# Patient Record
Sex: MALE | Race: WHITE | Employment: STUDENT | URBAN - METROPOLITAN AREA
[De-identification: names, ages, dates, MRNs, and addresses within clinical notes are randomized per-mention and may not be internally consistent; named-entity substitution may affect disease eponyms.]

---

## 2018-02-28 PROBLEM — M79.662 PAIN IN BOTH LOWER LEGS: Status: ACTIVE | Noted: 2018-02-28

## 2018-02-28 PROBLEM — R26.2 DIFFICULTY WALKING: Status: ACTIVE | Noted: 2018-02-28

## 2018-02-28 PROBLEM — M79.661 PAIN IN BOTH LOWER LEGS: Status: ACTIVE | Noted: 2018-02-28

## 2018-03-05 PROBLEM — M62.81 MUSCLE WEAKNESS: Status: ACTIVE | Noted: 2018-03-05

## 2018-03-20 ENCOUNTER — HOSPITAL ENCOUNTER (OUTPATIENT)
Age: 18
Discharge: HOME | End: 2018-03-20
Payer: OTHER GOVERNMENT

## 2018-03-20 LAB
C-REACTIVE PROTEIN: 0.7 MG/DL (ref 0–0.4)
HOMOCYSTEINE: 8.7 UMOL/L (ref 0–15)
RHEUMATOID FACTOR: <10 IU/ML (ref 0–13)
SEDIMENTATION RATE, ERYTHROCYTE: 10 MM/HR (ref 0–15)
TOTAL CK: 64 U/L (ref 20–200)
VITAMIN B-12: 489 PG/ML (ref 211–946)
VITAMIN D 25-HYDROXY: 14 NG/ML (ref 30–100)

## 2018-03-20 PROCEDURE — 82607 VITAMIN B-12: CPT

## 2018-03-20 PROCEDURE — 86703 HIV-1/HIV-2 1 RESULT ANTBDY: CPT

## 2018-03-20 PROCEDURE — 86431 RHEUMATOID FACTOR QUANT: CPT

## 2018-03-20 PROCEDURE — 86618 LYME DISEASE ANTIBODY: CPT

## 2018-03-20 PROCEDURE — 80074 ACUTE HEPATITIS PANEL: CPT

## 2018-03-20 PROCEDURE — 82306 VITAMIN D 25 HYDROXY: CPT

## 2018-03-20 PROCEDURE — 83921 ORGANIC ACID SINGLE QUANT: CPT

## 2018-03-20 PROCEDURE — 85613 RUSSELL VIPER VENOM DILUTED: CPT

## 2018-03-20 PROCEDURE — 84166 PROTEIN E-PHORESIS/URINE/CSF: CPT

## 2018-03-20 PROCEDURE — 82550 ASSAY OF CK (CPK): CPT

## 2018-03-20 PROCEDURE — 85651 RBC SED RATE NONAUTOMATED: CPT

## 2018-03-20 PROCEDURE — 36415 COLL VENOUS BLD VENIPUNCTURE: CPT

## 2018-03-20 PROCEDURE — 83090 ASSAY OF HOMOCYSTEINE: CPT

## 2018-03-20 PROCEDURE — 82085 ASSAY OF ALDOLASE: CPT

## 2018-03-20 PROCEDURE — 84165 PROTEIN E-PHORESIS SERUM: CPT

## 2018-03-20 PROCEDURE — 86140 C-REACTIVE PROTEIN: CPT

## 2018-03-21 LAB
HAV IGM SER IA-ACNC: NORMAL
HEPATITIS B CORE IGM ANTIBODY: NORMAL
HEPATITIS B SURFACE ANTIGEN INTERPRETATION: NORMAL
HEPATITIS C ANTIBODY INTERPRETATION: NORMAL
HIV-1 AND HIV-2 ANTIBODIES: NORMAL
LUPUS ANTICOAG DVVT: NORMAL

## 2018-03-22 LAB
ALDOLASE: 5.6 U/L (ref 1.5–8.1)
LYME, EIA: 0.24 LIV (ref 0–1.2)

## 2018-03-23 LAB
ADDENDUM ELECTROPHORESIS URINE RANDOM: NORMAL
ALBUMIN SERPL-MCNC: 4 G/DL (ref 3.5–4.7)
ALPHA-1-GLOBULIN: 0.3 G/DL (ref 0.2–0.4)
ALPHA-2-GLOBULIN: 1 G/FL (ref 0.5–1)
BETA GLOBULIN: 1.4 G/DL (ref 0.8–1.3)
ELECTROPHORESIS: ABNORMAL
GAMMA GLOBULIN: 1.2 G/DL (ref 0.7–1.6)
TOTAL PROTEIN: 7.9 G/DL (ref 6.4–8.3)

## 2018-03-24 LAB — METHYLMALONIC ACID: 0.1 UMOL/L (ref 0–0.4)

## 2018-07-06 ENCOUNTER — HOSPITAL ENCOUNTER (EMERGENCY)
Age: 18
Discharge: HOME | End: 2018-07-07
Attending: EMERGENCY MEDICINE
Payer: OTHER GOVERNMENT

## 2018-07-06 PROCEDURE — 99284 EMERGENCY DEPT VISIT MOD MDM: CPT

## 2018-07-06 ASSESSMENT — PAIN SCALES - GENERAL: PAINLEVEL_OUTOF10: 1

## 2018-07-06 ASSESSMENT — PAIN DESCRIPTION - LOCATION: LOCATION: LEG

## 2018-07-06 ASSESSMENT — PAIN DESCRIPTION - PAIN TYPE: TYPE: ACUTE PAIN

## 2018-07-06 ASSESSMENT — PAIN DESCRIPTION - ORIENTATION: ORIENTATION: LEFT;LOWER

## 2018-07-07 ENCOUNTER — APPOINTMENT (OUTPATIENT)
Dept: GENERAL RADIOLOGY | Age: 18
End: 2018-07-07
Payer: OTHER GOVERNMENT

## 2018-07-07 ENCOUNTER — APPOINTMENT (OUTPATIENT)
Dept: CT IMAGING | Age: 18
End: 2018-07-07
Payer: OTHER GOVERNMENT

## 2018-07-07 VITALS
WEIGHT: 180 LBS | OXYGEN SATURATION: 99 % | SYSTOLIC BLOOD PRESSURE: 118 MMHG | RESPIRATION RATE: 20 BRPM | HEART RATE: 97 BPM | BODY MASS INDEX: 26.66 KG/M2 | HEIGHT: 69 IN | DIASTOLIC BLOOD PRESSURE: 69 MMHG | TEMPERATURE: 98.3 F

## 2018-07-07 LAB
ANION GAP SERPL CALCULATED.3IONS-SCNC: 13 MMOL/L (ref 7–16)
BASOPHILS ABSOLUTE: 0.04 E9/L (ref 0–0.2)
BASOPHILS RELATIVE PERCENT: 0.4 % (ref 0–2)
BUN BLDV-MCNC: 16 MG/DL (ref 6–20)
CALCIUM SERPL-MCNC: 9.2 MG/DL (ref 8.6–10.2)
CHLORIDE BLD-SCNC: 99 MMOL/L (ref 98–107)
CO2: 29 MMOL/L (ref 22–29)
CREAT SERPL-MCNC: 1 MG/DL (ref 0.7–1.2)
EOSINOPHILS ABSOLUTE: 0.15 E9/L (ref 0.05–0.5)
EOSINOPHILS RELATIVE PERCENT: 1.6 % (ref 0–6)
GFR AFRICAN AMERICAN: >60
GFR NON-AFRICAN AMERICAN: >60 ML/MIN/1.73
GLUCOSE BLD-MCNC: 95 MG/DL (ref 74–109)
HCT VFR BLD CALC: 39.8 % (ref 37–54)
HEMOGLOBIN: 13 G/DL (ref 12.5–16.5)
IMMATURE GRANULOCYTES #: 0.04 E9/L
IMMATURE GRANULOCYTES %: 0.4 % (ref 0–5)
LYMPHOCYTES ABSOLUTE: 2.83 E9/L (ref 1.5–4)
LYMPHOCYTES RELATIVE PERCENT: 30.2 % (ref 20–42)
MCH RBC QN AUTO: 28.1 PG (ref 26–35)
MCHC RBC AUTO-ENTMCNC: 32.7 % (ref 32–34.5)
MCV RBC AUTO: 86.1 FL (ref 80–99.9)
MONOCYTES ABSOLUTE: 0.52 E9/L (ref 0.1–0.95)
MONOCYTES RELATIVE PERCENT: 5.6 % (ref 2–12)
NEUTROPHILS ABSOLUTE: 5.78 E9/L (ref 1.8–7.3)
NEUTROPHILS RELATIVE PERCENT: 61.8 % (ref 43–80)
PDW BLD-RTO: 12.3 FL (ref 11.5–15)
PLATELET # BLD: 249 E9/L (ref 130–450)
PMV BLD AUTO: 11.4 FL (ref 7–12)
POTASSIUM SERPL-SCNC: 3.8 MMOL/L (ref 3.5–5)
RBC # BLD: 4.62 E12/L (ref 3.8–5.8)
SODIUM BLD-SCNC: 141 MMOL/L (ref 132–146)
WBC # BLD: 9.4 E9/L (ref 4.5–11.5)

## 2018-07-07 PROCEDURE — 80048 BASIC METABOLIC PNL TOTAL CA: CPT

## 2018-07-07 PROCEDURE — 36415 COLL VENOUS BLD VENIPUNCTURE: CPT

## 2018-07-07 PROCEDURE — 6370000000 HC RX 637 (ALT 250 FOR IP): Performed by: EMERGENCY MEDICINE

## 2018-07-07 PROCEDURE — 2580000003 HC RX 258: Performed by: EMERGENCY MEDICINE

## 2018-07-07 PROCEDURE — 71275 CT ANGIOGRAPHY CHEST: CPT

## 2018-07-07 PROCEDURE — 6360000004 HC RX CONTRAST MEDICATION: Performed by: EMERGENCY MEDICINE

## 2018-07-07 PROCEDURE — 85025 COMPLETE CBC W/AUTO DIFF WBC: CPT

## 2018-07-07 PROCEDURE — 73560 X-RAY EXAM OF KNEE 1 OR 2: CPT

## 2018-07-07 RX ORDER — SULFAMETHOXAZOLE AND TRIMETHOPRIM 800; 160 MG/1; MG/1
1 TABLET ORAL ONCE
Status: COMPLETED | OUTPATIENT
Start: 2018-07-07 | End: 2018-07-07

## 2018-07-07 RX ORDER — 0.9 % SODIUM CHLORIDE 0.9 %
1000 INTRAVENOUS SOLUTION INTRAVENOUS ONCE
Status: DISCONTINUED | OUTPATIENT
Start: 2018-07-07 | End: 2018-07-07 | Stop reason: HOSPADM

## 2018-07-07 RX ORDER — SULFAMETHOXAZOLE AND TRIMETHOPRIM 800; 160 MG/1; MG/1
1 TABLET ORAL 2 TIMES DAILY
Qty: 20 TABLET | Refills: 0 | Status: SHIPPED | OUTPATIENT
Start: 2018-07-07 | End: 2018-07-17

## 2018-07-07 RX ORDER — CEPHALEXIN 500 MG/1
500 CAPSULE ORAL 4 TIMES DAILY
Qty: 40 CAPSULE | Refills: 0 | Status: SHIPPED | OUTPATIENT
Start: 2018-07-07 | End: 2018-07-17

## 2018-07-07 RX ORDER — SODIUM CHLORIDE 0.9 % (FLUSH) 0.9 %
SYRINGE (ML) INJECTION
Status: DISCONTINUED
Start: 2018-07-07 | End: 2018-07-07 | Stop reason: HOSPADM

## 2018-07-07 RX ORDER — CEPHALEXIN 250 MG/1
500 CAPSULE ORAL ONCE
Status: COMPLETED | OUTPATIENT
Start: 2018-07-07 | End: 2018-07-07

## 2018-07-07 RX ADMIN — CEPHALEXIN 500 MG: 250 CAPSULE ORAL at 02:07

## 2018-07-07 RX ADMIN — IOPAMIDOL 80 ML: 755 INJECTION, SOLUTION INTRAVENOUS at 02:33

## 2018-07-07 RX ADMIN — SULFAMETHOXAZOLE AND TRIMETHOPRIM 1 TABLET: 800; 160 TABLET ORAL at 02:07

## 2018-07-07 ASSESSMENT — ENCOUNTER SYMPTOMS
DIARRHEA: 0
SHORTNESS OF BREATH: 0
VOMITING: 0
COUGH: 0
ABDOMINAL PAIN: 0

## 2018-07-07 NOTE — ED PROVIDER NOTES
exam reveals incision in the left popliteal region to be covered with Dermabond. There is no active drainage. There is a surrounding papulovesicular eruption extending out approximately 3 cm from the wound edges of this incision. There is minor soft tissue swelling of the left calf, some bruising in this area. He has no significant tenderness with palpation of the calf. He has a good strong DP pulse, good cap refill in the toes, no motor or sensory deficits in the lower extremity. The exam is unremarkable.      ----------------------------------------------- PAST HISTORY --------------------------------------------  Past Medical History:  has a past medical history of Bilateral calf pain; Difficulty walking; GERD (gastroesophageal reflux disease); IBS (irritable bowel syndrome); Numbness and tingling; Pain in both lower legs; Popliteal artery stenosis ([address] Prematurity; and Weakness of hand. Past Surgical History:  has a past surgical history that includes Circumcision. Social History:  reports that he has never smoked. He has never used smokeless tobacco. He reports that he does not drink alcohol or use drugs. Family History: family history includes Asthma in his mother; High Blood Pressure in his father. The patients home medications have been reviewed. Allergies: Patient has no known allergies.     ------------------------------------------------ RESULTS ---------------------------------------------------    Labs:  Results for orders placed or performed during the hospital encounter of 07/06/18   CBC auto differential   Result Value Ref Range    WBC 9.4 4.5 - 11.5 E9/L    RBC 4.62 3.80 - 5.80 E12/L    Hemoglobin 13.0 12.5 - 16.5 g/dL    Hematocrit 39.8 37.0 - 54.0 %    MCV 86.1 80.0 - 99.9 fL    MCH 28.1 26.0 - 35.0 pg    MCHC 32.7 32.0 - 34.5 %    RDW 12.3 11.5 - 15.0 fL    Platelets 683 512 - 177 E9/L    MPV 11.4 7.0 - 12.0 fL    Neutrophils % 61.8 43.0 - 80.0 %    Immature Granulocytes % 0.4 patient's wound appears inflamed and irritated, it is not completely clear whether or not this is an infection or just an irritated wound but he did have a fever yesterday and again today and he is mildly tachycardic. So we are going to treat it as a possible wound infection. I spoke with the vascular surgeon on-call. We will start antibiotics. His lab workup is unremarkable here today and he still has a mild tachycardia even after getting IV fluids. This may be on the basis of an infection, but the other concern I have is a possible pulmonary embolism given his recent surgery. He does not have chest pain or difficulty breathing so this is unlikely but we are going to CT his chest prior to discharge. It is too late to HIS LEG TONIGHT BUT I THINK WITH THE CT CHEST BEING NEGATIVE IT MAKES SENSE TO DISCHARGE HIM HOME ON ORAL ANTIBIOTICS AND ASSESS HIM AS AN OUTPATIENT. HE WILL BE GIVEN CLOSE FOLLOW-UP WITH HIS SURGEON. CONSULTATIONS:            I spoke with Dr. Jenna Scruggs, vascular surgeon who did his procedure at the [facility]. He advised that once his workup is done here, I may discharge him home on oral antibiotics and asks that I reiterate to him that he needs to elevate the leg and the Ace wrap on some toes up to proximal to the knee. He will see the patient in his office on Monday, 3 days from now. COUNSELING:   I have spoken with the mother and discussed todays results, in addition to providing specific details for the plan of care and counseling regarding the diagnosis and prognosis.     --------------------------------------- IMPRESSION & DISPOSITION --------------------------------     IMPRESSION(s):  1. Postoperative cellulitis of surgical wound, initial encounter        This patient's ED course included: a personal history and physicial examination and re-evaluation prior to disposition    This patient has remained hemodynamically stable during their ED course.     DISPOSITION:  Disposition: Discharge to home. Patient condition is stable. Endorsement of Care      The remaining care of Nara Garcia was endorsed to Dr. America Riley  on 7/7/18 at 1:56 AM.  Original ED Provider Note Prepared and electronically signed by:  Willie Hernandez MD      END OF PROVIDER NOTE.        Sydney Mendoza MD  07/07/18 0157

## 2019-04-24 ENCOUNTER — HOSPITAL ENCOUNTER (EMERGENCY)
Age: 19
Discharge: HOME | End: 2019-04-24
Payer: OTHER MISCELLANEOUS

## 2019-04-24 ENCOUNTER — APPOINTMENT (OUTPATIENT)
Dept: GENERAL RADIOLOGY | Age: 19
End: 2019-04-24
Payer: OTHER MISCELLANEOUS

## 2019-04-24 VITALS
BODY MASS INDEX: 28.93 KG/M2 | HEIGHT: 69 IN | DIASTOLIC BLOOD PRESSURE: 69 MMHG | SYSTOLIC BLOOD PRESSURE: 138 MMHG | WEIGHT: 195.31 LBS | HEART RATE: 102 BPM | OXYGEN SATURATION: 97 % | TEMPERATURE: 97.9 F | RESPIRATION RATE: 16 BRPM

## 2019-04-24 DIAGNOSIS — S86.911A STRAIN OF RIGHT KNEE, INITIAL ENCOUNTER: Primary | ICD-10-CM

## 2019-04-24 PROCEDURE — 73562 X-RAY EXAM OF KNEE 3: CPT

## 2019-04-24 PROCEDURE — 99283 EMERGENCY DEPT VISIT LOW MDM: CPT

## 2019-04-24 RX ORDER — NAPROXEN 500 MG/1
500 TABLET ORAL 2 TIMES DAILY
Qty: 14 TABLET | Refills: 0 | Status: SHIPPED | OUTPATIENT
Start: 2019-04-24 | End: 2019-05-01

## 2019-04-24 ASSESSMENT — PAIN SCALES - GENERAL: PAINLEVEL_OUTOF10: 6

## 2019-04-24 ASSESSMENT — PAIN DESCRIPTION - LOCATION: LOCATION: KNEE

## 2019-04-24 ASSESSMENT — PAIN DESCRIPTION - ORIENTATION: ORIENTATION: RIGHT

## 2019-04-24 ASSESSMENT — PAIN DESCRIPTION - DESCRIPTORS: DESCRIPTORS: THROBBING

## 2019-04-24 ASSESSMENT — PAIN DESCRIPTION - PAIN TYPE: TYPE: ACUTE PAIN

## 2019-04-24 NOTE — ED PROVIDER NOTES
[facility]    Department of Emergency Medicine   ED  Provider Note  Admit Date/RoomTime: 4/24/2019 11:50 AM  ED Room: [place] [address]  Chief Complaint   Knee Pain (right knee pain and swelling; heard it \"pop\" twice last night)    History of Present Illness   Source of history provided by:  patient. History/Exam Limitations: none. Jennie Zheng is a 22 y.o. old male who has a past medical history of:   Past Medical History:   Diagnosis Date    Bilateral calf pain     Difficulty walking 2/28/2018    GERD (gastroesophageal reflux disease)     IBS (irritable bowel syndrome)     Numbness and tingling     BOTH CALFS    Pain in both lower legs 2/28/2018    Popliteal artery stenosis (HCC)     Prematurity     Weakness of hand     GRIPP WEAKNESS & ALSO SHAKE WHEN GRIPPING    presents to the emergency department by private vehicle, for pain located medial and lateral to right knee  which occured 1 day(s) prior to arrival.  Patient states that he has a hx of popliteal artery stenosis. States he has surgery at the [facility] last year on the left knee. Patient states this pain is different than the popliteal artery stenosis pain that he has. He states that he was standing up last night, states he was stretching his knees inward and felt a pop in the right knee while doing this. States he heard two pops and has had pain since then. He reports increased pain w/movement of the knees, nothing makes it better. He denies any calf pain. ROS    Pertinent positives and negatives are stated within HPI, all other systems reviewed and are negative. Past Surgical History:   Procedure Laterality Date    CIRCUMCISION      KNEE SURGERY     Social History:  reports that he has never smoked. He has never used smokeless tobacco. He reports that he does not drink alcohol or use drugs. Family History: family history includes Asthma in his mother; High Blood Pressure in his father.    Allergies: Patient has no known allergies. Physical Exam          ED Triage Vitals [04/24/19 1146]   BP Temp Temp Source Pulse Resp SpO2 Height Weight   138/69 97.9 °F (36.6 °C) Oral 119 16 96 % 5' 9\" (1.753 m) 195 lb 5 oz (88.6 kg)       Oxygen Saturation Interpretation: Normal.    Constitutional:  Alert, development consistent with age. Neck:  Normal ROM. Supple. Knee:  Right medial, lateral:             Tenderness:  mild. .              Swelling/Effusion: Mild. Deformity: no.              ROM: full range with pain. Skin:  no erythema, rash or wounds noted. Drawer's:  Negative. Lachman's: Negative. Valgus/Varus Stress: Negative. Crepitus: Negative. Hip:            Tenderness:  none. Swelling: None. Deformity: no.              ROM: full range of motion. Skin:  no erythema, rash or wounds noted. Joint(s) Below: calf. Tenderness:  none. Swelling: No.              Skin:  no erythema, rash or wounds noted. Neurovascular: Motor deficit: none. Sensory deficit: none. Pulse deficit: none. 2+ dorsalis pedis pulse of the right foot. Capillary refill: normal.  Gait:  limp. Lymphatics: No lymphangitis or adenopathy noted. Neurological:  Oriented. Motor functions intact. Lab / Imaging Results   (All laboratory and radiology results have been personally reviewed by myself)  Labs:  No results found for this visit on 04/24/19. Imaging: All Radiology results interpreted by Radiologist unless otherwise noted. XR KNEE RIGHT (3 VIEWS)   Final Result   No acute findings. ED Course / Medical Decision Making   Medications - No data to display     Consult(s):   None    Procedure(s):   none. Medical Decision Making:    Patient has no calf tenderness, swelling or erythema, no sign of DVT at this time. He has a good peripheral pulse.  States that this pain feels different than the pain that he gets with the popliteal artery stenosis. States that he was stretching his knees inward and felt a pop and has had pain since then. It is localized to the knee, no pain elsewhere. No acute findings seen on x-ray today. We'll discharge him at this time. Advised patient to return to the ER for any worsening symptoms. Otherwise to follow-up with orthopedic. Counseling: The emergency provider has spoken with the patient and mother and discussed todays results, in addition to providing specific details for the plan of care and counseling regarding the diagnosis and prognosis. Questions are answered at this time and they are agreeable with the plan. Assessment      1. Strain of right knee, initial encounter      Plan   Discharge to home  Patient condition is good    New Medications     New Prescriptions    NAPROXEN (NAPROSYN) 500 MG TABLET    Take 1 tablet by mouth 2 times daily for 7 days     Electronically signed by ES Tristan   DD: 4/24/19  **This report was transcribed using voice recognition software. Every effort was made to ensure accuracy; however, inadvertent computerized transcription errors may be present.   END OF ED PROVIDER NOTE       Jamarcus Tristan  04/24/19 3552

## 2019-04-24 NOTE — ED NOTES
INSTRUCTION TO PT AND MOM PER S [facility]NA[facility] PAC. ACEWRAP TO TINO PROCTOR PER PAC. PT IS USING OWN WALKER FROM PREV SURG.       Donnell Navarrete, ANTIONE  50/37/70 [id number]

## 2019-06-07 ENCOUNTER — APPOINTMENT (OUTPATIENT)
Dept: GENERAL RADIOLOGY | Age: 19
End: 2019-06-07
Payer: COMMERCIAL

## 2019-06-07 ENCOUNTER — HOSPITAL ENCOUNTER (EMERGENCY)
Age: 19
Discharge: HOME | End: 2019-06-07
Payer: COMMERCIAL

## 2019-06-07 VITALS
DIASTOLIC BLOOD PRESSURE: 77 MMHG | RESPIRATION RATE: 18 BRPM | WEIGHT: 195 LBS | SYSTOLIC BLOOD PRESSURE: 122 MMHG | BODY MASS INDEX: 27.3 KG/M2 | HEART RATE: 108 BPM | TEMPERATURE: 99.8 F | HEIGHT: 71 IN | OXYGEN SATURATION: 97 %

## 2019-06-07 DIAGNOSIS — R05.9 COUGH: Primary | ICD-10-CM

## 2019-06-07 DIAGNOSIS — J06.9 ACUTE UPPER RESPIRATORY INFECTION: ICD-10-CM

## 2019-06-07 PROCEDURE — 99283 EMERGENCY DEPT VISIT LOW MDM: CPT

## 2019-06-07 PROCEDURE — 71046 X-RAY EXAM CHEST 2 VIEWS: CPT

## 2019-06-07 RX ORDER — AZITHROMYCIN 250 MG/1
TABLET, FILM COATED ORAL
Qty: 1 PACKET | Refills: 0 | Status: SHIPPED | OUTPATIENT
Start: 2019-06-07 | End: 2019-06-17

## 2019-06-07 RX ORDER — BROMPHENIRAMINE MALEATE, PSEUDOEPHEDRINE HYDROCHLORIDE, AND DEXTROMETHORPHAN HYDROBROMIDE 2; 30; 10 MG/5ML; MG/5ML; MG/5ML
5 SYRUP ORAL 4 TIMES DAILY PRN
Qty: 120 ML | Refills: 0 | Status: SHIPPED | OUTPATIENT
Start: 2019-06-07 | End: 2019-06-07 | Stop reason: SDUPTHER

## 2019-06-07 RX ORDER — BROMPHENIRAMINE MALEATE, PSEUDOEPHEDRINE HYDROCHLORIDE, AND DEXTROMETHORPHAN HYDROBROMIDE 2; 30; 10 MG/5ML; MG/5ML; MG/5ML
5 SYRUP ORAL 4 TIMES DAILY PRN
Qty: 120 ML | Refills: 0 | Status: SHIPPED | OUTPATIENT
Start: 2019-06-07 | End: 2019-06-12

## 2019-06-07 RX ORDER — AZITHROMYCIN 250 MG/1
TABLET, FILM COATED ORAL
Qty: 1 PACKET | Refills: 0 | Status: SHIPPED | OUTPATIENT
Start: 2019-06-07 | End: 2019-06-07 | Stop reason: SDUPTHER

## 2019-06-07 NOTE — ED PROVIDER NOTES
[facility]    HPI: Rohan Aguirre is a 22 y.o. male with a past medical history of  has a past medical history of Bilateral calf pain, Difficulty walking, GERD (gastroesophageal reflux disease), IBS (irritable bowel syndrome), Numbness and tingling, Pain in both lower legs, Popliteal artery stenosis ([address] Prematurity, and Weakness of hand. presenting with complaints of a cough. The patient states that these symptoms began gradually. The history is obtained from the patient. The patient states that he has had some subjective chills at home. Patient does complain of a mild cough associated with it that is nonproductive. Patient denies excessive fatigue or sleeping greater than 18 hours a day. Patient denies exposure to mononucleosis. The patient denies any abdominal pain, left upper quadrant fullness, or early satiety. The patient also denies difficulty breathing, hemoptysis, neck pain/stiffness, or blurry vision. Sx have persisted and are mildly worse which is what prompted the visit today. No knwon exposure to sick contacts. Mother provides information stating that he has had a intermittent fever for the past few days. Most recently early this morning at 101.2. He's had a dry nonproductive cough.        ROS:   Pertinent positives and negatives are stated within HPI, all other systems reviewed and are negative.      --------------------------------------------- PAST HISTORY ---------------------------------------------  Past Medical History:  has a past medical history of Bilateral calf pain, Difficulty walking, GERD (gastroesophageal reflux disease), IBS (irritable bowel syndrome), Numbness and tingling, Pain in both lower legs, Popliteal artery stenosis ([address] Prematurity, and Weakness of hand. Past Surgical History:  has a past surgical history that includes Circumcision and knee surgery. Social History:  reports that he has never smoked.  He has never used smokeless tobacco. He reports that he does not drink alcohol or use drugs. Family History: family history includes Asthma in his mother; High Blood Pressure in his father. The patients home medications have been reviewed. Allergies: Patient has no known allergies. ------------------------- NURSING NOTES AND VITALS REVIEWED ---------------------------   The nursing notes within the ED encounter and vital signs as below have been reviewed by myself. /77   Pulse 108   Temp 99.8 °F (37.7 °C) (Oral)   Resp 18   Ht 5' 11\" (1.803 m)   Wt 195 lb (88.5 kg)   SpO2 97%   BMI 27.20 kg/m²   Oxygen Saturation Interpretation: Normal    The patients available past medical records and past encounters were reviewed. Physical exam:  Constitutional: Vital signs are reviewed the patient is comfortable. The patient is alert and oriented and conversant. Head: The head is atraumatic and normocephalic. Eyes: No discharge is present from the eyes. The sclera are normal.  ENT: The oropharynx demonstrates a small amount of erythema bilaterally. There is no tonsillar enlargement nor is there any exudate present. No uvular deviation or edema. No tonsillary asymmetry.  Floor of the mouth soft, no trismus, handling secretions. TMs bilaterally demonstrate no evidence of infection. Neck: Normal range of motion is achieved in the neck. There is no JVD present. No meningeal signs are present   Anterior cervical adenopathy is normal.  Respiratory/chest: The chest is nontender. Breath sounds are normal. There is no respiratory distress.   Cardiovascular: Heart shows a regular rate and rhythm without murmurs clicks or gallops. Abdominal exam: The abdomen is non tender without evidence of peritoneal signs.  Specific attention to the left upper quadrant with palpation of the spleen demonstrates no organomegaly or tenderness  Skin: warm and dry, without rash  Neurologic: GCS 15   Psych: Normal Affect  -------------------------------------------------- RESULTS -------------------------------------------------    LABS:  No results found for this visit on 06/07/19. RADIOLOGY:  Interpreted by Radiologist.  XR CHEST STANDARD (2 VW)   Final Result   No acute cardiopulmonary disease.             ------------------------------ ED COURSE/MEDICAL DECISION MAKING----------------------  Medications - No data to display          Medical Decision Making:         cough, Upper respiratory infection likely viral in etiology. Not hypoxic, nothing to suggests pneumonia. Well appearing, non toxic, appropriate for outpatient management. Plan is for symptom management and PCP follow up.         This patient's ED course included: a personal history and physicial eaxmination and re-evaluation prior to disposition    This patient has remained hemodynamically stable during their ED course. Counseling: The emergency provider has spoken with the patient and discussed todays results, in addition to providing specific details for the plan of care and counseling regarding the diagnosis and prognosis. Questions are answered at this time and they are agreeable with the plan.       --------------------------------- IMPRESSION AND DISPOSITION ---------------------------------    IMPRESSION  1.  Cough    2. Acute upper respiratory infection        DISPOSITION  Disposition: Discharge to home  Patient condition is good              Jessica Ervin, SARINA - CNP  06/07/19 0800

## 2019-06-07 NOTE — ED NOTES
Patient is alert and oriented at this time, no needs nor concerns, mother at bedside, + cough/ congestion non productive     Nhan Montiel, RN  06/07/19 1594

## 2019-07-03 ENCOUNTER — HOSPITAL ENCOUNTER (EMERGENCY)
Age: 19
Discharge: HOME | End: 2019-07-03
Attending: EMERGENCY MEDICINE
Payer: COMMERCIAL

## 2019-07-03 ENCOUNTER — APPOINTMENT (OUTPATIENT)
Dept: ULTRASOUND IMAGING | Age: 19
End: 2019-07-03
Payer: COMMERCIAL

## 2019-07-03 VITALS
SYSTOLIC BLOOD PRESSURE: 135 MMHG | OXYGEN SATURATION: 98 % | HEIGHT: 71 IN | DIASTOLIC BLOOD PRESSURE: 80 MMHG | WEIGHT: 194 LBS | TEMPERATURE: 98.6 F | HEART RATE: 98 BPM | BODY MASS INDEX: 27.16 KG/M2 | RESPIRATION RATE: 18 BRPM

## 2019-07-03 DIAGNOSIS — R23.8 SKIN IRRITATION: ICD-10-CM

## 2019-07-03 DIAGNOSIS — Z51.89 VISIT FOR WOUND CHECK: Primary | ICD-10-CM

## 2019-07-03 PROCEDURE — 93971 EXTREMITY STUDY: CPT

## 2019-07-03 PROCEDURE — 6370000000 HC RX 637 (ALT 250 FOR IP): Performed by: EMERGENCY MEDICINE

## 2019-07-03 PROCEDURE — 99283 EMERGENCY DEPT VISIT LOW MDM: CPT

## 2019-07-03 RX ORDER — CEPHALEXIN 250 MG/1
500 CAPSULE ORAL ONCE
Status: COMPLETED | OUTPATIENT
Start: 2019-07-03 | End: 2019-07-03

## 2019-07-03 RX ORDER — CEPHALEXIN 500 MG/1
500 CAPSULE ORAL 3 TIMES DAILY
Qty: 21 CAPSULE | Refills: 0 | Status: SHIPPED | OUTPATIENT
Start: 2019-07-03 | End: 2019-07-10

## 2019-07-03 RX ADMIN — CEPHALEXIN 500 MG: 250 CAPSULE ORAL at 21:19

## 2019-07-03 ASSESSMENT — PAIN SCALES - GENERAL: PAINLEVEL_OUTOF10: 5

## 2019-07-03 NOTE — ED PROVIDER NOTES
27-year-old male presenting with concerns about an infection postoperatively. He had a right leg orthopedic surgery in [facility] about 1 week ago. He has developed some redness and some irritation to the skin along the incision. It is not obviously cellulitic, no fevers, no chills. He has some numbness following the surgery that is unchanged from the last week. He is nontoxic-appearing, able to ambulate, no distress. Review of Systems   Skin: Positive for wound. All other systems reviewed and are negative. Physical Exam   Constitutional: He is oriented to person, place, and time. He appears well-developed and well-nourished. No distress. HENT:   Head: Normocephalic and atraumatic. Eyes: Pupils are equal, round, and reactive to light. Neck: Normal range of motion. Neck supple. No thyromegaly present. Cardiovascular: Normal rate and regular rhythm. Pulmonary/Chest: Effort normal and breath sounds normal. No respiratory distress. He has no wheezes. Abdominal: Soft. He exhibits no distension and no mass. There is no tenderness. There is no rebound and no guarding. Musculoskeletal: Normal range of motion. He exhibits no edema or tenderness. Legs:  Surgical incision, some slight erythema around the incision, there is some blistering appearance more consistent with a reaction, not consistent with a cellulitis. No obvious purulent drainage, no crepitus, no induration, no fluctuance   Neurological: He is alert and oriented to person, place, and time. No cranial nerve deficit. Skin: Skin is warm and dry. No erythema. Psychiatric: He has a normal mood and affect.        Procedures    MDM                 --------------------------------------------- PAST HISTORY ---------------------------------------------  Past Medical History:  has a past medical history of Bilateral calf pain, Difficulty walking, GERD (gastroesophageal reflux disease), IBS (irritable bowel syndrome),

## 2022-03-18 ENCOUNTER — OFFICE VISIT (OUTPATIENT)
Dept: SPORTS MEDICINE | Age: 22
End: 2022-03-18
Payer: COMMERCIAL

## 2022-03-18 VITALS — BODY MASS INDEX: 29.4 KG/M2 | HEIGHT: 71 IN | WEIGHT: 210 LBS

## 2022-03-18 DIAGNOSIS — M79.A22 EXERTIONAL COMPARTMENT SYNDROME OF BOTH LOWER EXTREMITIES: ICD-10-CM

## 2022-03-18 DIAGNOSIS — M79.A21 EXERTIONAL COMPARTMENT SYNDROME OF BOTH LOWER EXTREMITIES: ICD-10-CM

## 2022-03-18 DIAGNOSIS — M79.662 BILATERAL CALF PAIN: Primary | ICD-10-CM

## 2022-03-18 DIAGNOSIS — M79.661 BILATERAL CALF PAIN: Primary | ICD-10-CM

## 2022-03-18 PROCEDURE — 99204 OFFICE O/P NEW MOD 45 MIN: CPT | Performed by: FAMILY MEDICINE

## 2022-03-18 RX ORDER — FAMOTIDINE 20 MG/1
20 TABLET, FILM COATED ORAL DAILY
COMMUNITY
End: 2022-07-07 | Stop reason: SDUPTHER

## 2022-03-18 NOTE — PROGRESS NOTES
[facility]  PRIMARY CARE SPORTS MEDICINE  DATE OF VISIT : 3/18/2022    Patient : Rakan Thompson  Age : 19 y.o.  : 2000  MRN : [id number]   ______________________________________________________________________    Chief Complaint :   Chief Complaint   Patient presents with    Leg Pain     Numbness b/l lower extremity along with tingling and burning, pain is localized in the calves and spreads down into the ankles, ongoing for years, ambulating bothers it the most,       HPI : Rakan Thompson is 19 y.o. male who presented to the clinic today for evaluation of bilateral calf pain/numbness. Symptoms have been present for years, without known mechanism of injury. Previously evaluated by [facility] and noticed with popliteal artery entrapment and subsequently underwent bilateral operations that were unsuccessful.   Current symptoms include  pain with associated numbness and tingling after minimal exertion that relieves with rest.     Past Medical History :  Past Medical History:   Diagnosis Date    Bilateral calf pain     Difficulty walking 2018    GERD (gastroesophageal reflux disease)     IBS (irritable bowel syndrome)     Numbness and tingling     BOTH CALFS    Pain in both lower legs 2018    Popliteal artery stenosis (HCC)     Prematurity     Weakness of hand     GRIPP WEAKNESS & ALSO SHAKE WHEN GRIPPING     Past Surgical History:   Procedure Laterality Date    CIRCUMCISION      KNEE SURGERY         Allergies :   No Known Allergies    Medication List :    Current Outpatient Medications   Medication Sig Dispense Refill    famotidine (PEPCID) 20 MG tablet Take 20 mg by mouth daily      Multiple Vitamins-Minerals (THERAPEUTIC MULTIVITAMIN-MINERALS) tablet Take 1 tablet by mouth daily      naproxen (NAPROSYN) 500 MG tablet Take 1 tablet by mouth 2 times daily for 7 days 14 tablet 0    aspirin EC 81 MG EC tablet Take 81 mg by mouth daily (Patient not taking: Reported on 3/18/2022)       No current facility-administered medications for this visit.      ______________________________________________________________________    Physical Exam :    Vitals: Ht 5' 11\" (1.803 m)   Wt 210 lb (95.3 kg)   BMI 29.29 kg/m²   General Appearance: Nontoxic, awake, alert, and in no acute distress. Chest wall/Lung: Respirations regular and unlabored. No cyanosis. Heart: RRR, distal pulses intact. Neurologic: Alert&Oriented x3. Sensation grossly intact. No focal motor deficits detected. Musculokeletal: Mild TTP over midsubstance of the gastroc bilaterally. No focal motor or sensory deficits identified. ______________________________________________________________________    Assessment & Plan :    1. Bilateral calf pain  2. 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 Patient previously treated for popliteal artery entrapment at [facility] but surgery appears to be unsuccessful. Patient endorses bilateral calf pain with minimal exertion stated with numbness and tingling that relieves with rest.  A referral to [facility] children's sports medicine was placed in the office today for evaluation of compartment syndrome either by compartment testing or functional MRI. Patient is agreeable with the above plan and all questions and concerns were addressed in the office today. Return to Office: Return if symptoms worsen or fail to improve.     Casie Martinez MD

## 2022-04-28 NOTE — PROGRESS NOTES
[facility]  Department of Family Medicine      Patient:  Elsa Springer 23 y.o. male     Date of Service: 4/28/22      Chief complaint:   Chief Complaint   [facility]   [facility]     [facility]         History ofPresent Illness   The patient is a 23 y.o. male  presented to the clinic with complaints as above. Compartment syndrome  -referred to [facility]   -another surgery?  -has appt with ortho on Monday  -still having a lot of trouble walking due to this    Is in college, going to [employer] for [employer]  -is going well for him getting a's and b's, currently not in school due to this pain and possible need for surgery     Moving bowels and urinating normally            Past Medical History:      Diagnosis Date    Bilateral calf pain     Difficulty walking 2/28/2018    GERD (gastroesophageal reflux disease)     IBS (irritable bowel syndrome)     Numbness and tingling     BOTH CALFS    Pain in both lower legs 2/28/2018    Popliteal artery stenosis (HCC)     Prematurity     Weakness of hand     GRIPP WEAKNESS & ALSO SHAKE WHEN GRIPPING       PastSurgical History:        Procedure Laterality Date    CIRCUMCISION      KNEE SURGERY      VASCULAR SURGERY         Allergies:    Patient has no known allergies.     Social History:   Social History     Socioeconomic History    Marital status: Single     Spouse name: Not on file    Number of children: Not on file    Years of education: Not on file    Highest education level: Not on file   Occupational History    Not on file   Tobacco Use    Smoking status: Never Smoker    Smokeless tobacco: Never Used   Substance and Sexual Activity    Alcohol use: No    Drug use: No    Sexual activity: Not on file   Other Topics Concern    Not on file   Social History Narrative    Not on file     Social Determinants of Health     Financial Resource Strain:     Difficulty of Paying Living Expenses: Not on file   Food Insecurity:     Worried About Running Out of Food in the Last Year: Not on file    Tracey of Food in the Last Year: Not on file   Transportation Needs:     Lack of Transportation (Medical): Not on file    Lack of Transportation (Non-Medical): Not on file   Physical Activity:     Days of Exercise per Week: Not on file    Minutes of Exercise per Session: Not on file   Stress:     Feeling of Stress : Not on file   Social Connections:     Frequency of Communication with Friends and Family: Not on file    Frequency of Social Gatherings with Friends and Family: Not on file    Attends Sikhism Services: Not on file    Active Member of [address] [place] [address] [employer] or Organizations: Not on file    Attends Club or Organization Meetings: Not on file    Marital Status: Not on file   Intimate Partner Violence:     Fear of Current or Ex-Partner: Not on file    Emotionally Abused: Not on file    Physically Abused: Not on file    Sexually Abused: Not on file   Housing Stability:     Unable to Pay for Housing in the Last Year: Not on file    Number of [place] in the Last Year: Not on file    Unstable Housing in the Last Year: Not on file        Family History:       Problem Relation Age of Onset    Asthma Mother     High Blood Pressure Father        Review of Systems:   Review of Systems - as above     Physical Exam   Vitals: /70   Pulse 92   Temp 96.9 °F (36.1 °C) (Infrared)   Resp 18   Ht 5' 9\" (1.753 m)   Wt 206 lb (93.4 kg)   SpO2 99%   BMI 30.42 kg/m²   Physical Exam  Constitutional:       Appearance: He is well-developed. HENT:      Head: Normocephalic and atraumatic. Eyes:      General:         Right eye: No discharge. Left eye: No discharge. Conjunctiva/sclera: Conjunctivae normal.   Neck:      Trachea: No tracheal deviation. Cardiovascular:      Rate and Rhythm: Normal rate and regular rhythm. Heart sounds: Normal heart sounds.    Pulmonary:      Effort: Pulmonary effort is normal. No respiratory distress. Breath sounds: Normal breath sounds. No wheezing. Abdominal:      General: Bowel sounds are normal. There is no distension. Palpations: Abdomen is soft. Tenderness: There is no abdominal tenderness. Musculoskeletal:         General: No tenderness. Cervical back: Normal range of motion and neck supple. Skin:     General: Skin is warm and dry. Neurological:      Mental Status: He is alert. Psychiatric:         Mood and Affect: Affect is flat. Assessment and Plan       1. Annual physical exam  Needs labs and screening as below  - CBC; Future  - Comprehensive Metabolic Panel; Future  - Hepatitis C Antibody; Future  - HIV Screen; Future  - Lipid Panel; Future    2. Class 1 obesity with body mass index (BMI) of 30.0 to 30.9 in adult, unspecified obesity type, unspecified whether serious comorbidity present  HCM:  - CBC; Future  - Comprehensive Metabolic Panel; Future  - Lipid Panel; Future    3. Pain in both lower legs  Chronic issue  -Compartment syndrome vs vascular entrapment, following with specialist for this     4. Need for hepatitis C screening test  HCM:  - Hepatitis C Antibody; Future    5. Encounter for screening for HIV  HCM:   - HIV Screen; Future      Counseled regarding above diagnosis, including possible risks and complications,  especially if left uncontrolled. Counseled regarding the possible side effects, risks, benefits and alternatives to treatment;patient and/or guardian verbalizes understanding, agrees, feels comfortable with and wishes to proceed with above treatment plan. Call or go to [address] [place] if symptoms worsen or persist. Advised patient to call with any new medication issues, and, as applicable, read all Rx info from pharmacy to assure aware of all possible risks and side effects of medicationbefore taking. Patient and/or guardian given opportunity to ask questions/raise concerns.   The patient verbalized comfort and understanding ofinstructions. I encourage further reading and education about your health conditions. Information on many health conditions is provided by [facility] [employer]: [email or web address]  Please bring any questions to me at your nextvisit. Return to Office: Return in about 1 year (around 4/29/2023) for annual physical .    Medication List:    Current Outpatient Medications   Medication Sig Dispense Refill    Multiple Vitamins-Minerals (THERAPEUTIC MULTIVITAMIN-MINERALS) tablet Take 1 tablet by mouth daily      famotidine (PEPCID) 20 MG tablet Take 20 mg by mouth daily (Patient not taking: Reported on 4/29/2022)      naproxen (NAPROSYN) 500 MG tablet Take 1 tablet by mouth 2 times daily for 7 days 14 tablet 0    aspirin EC 81 MG EC tablet Take 81 mg by mouth daily (Patient not taking: Reported on 3/18/2022)       No current facility-administered medications for this visit. Tavia Glaser 94, DO       This document may have been prepared at least partially through the use of voice recognition software. Although effort is taken to assure the accuracy ofthis document, it is possible that grammatical, syntax,  or spelling errors may occur.

## 2022-04-29 ENCOUNTER — OFFICE VISIT (OUTPATIENT)
Dept: PRIMARY CARE CLINIC | Age: 22
End: 2022-04-29
Payer: COMMERCIAL

## 2022-04-29 VITALS
SYSTOLIC BLOOD PRESSURE: 112 MMHG | HEIGHT: 69 IN | DIASTOLIC BLOOD PRESSURE: 70 MMHG | BODY MASS INDEX: 30.51 KG/M2 | TEMPERATURE: 96.9 F | RESPIRATION RATE: 18 BRPM | WEIGHT: 206 LBS | OXYGEN SATURATION: 99 % | HEART RATE: 92 BPM

## 2022-04-29 DIAGNOSIS — Z00.00 ANNUAL PHYSICAL EXAM: Primary | ICD-10-CM

## 2022-04-29 DIAGNOSIS — Z11.4 ENCOUNTER FOR SCREENING FOR HIV: ICD-10-CM

## 2022-04-29 DIAGNOSIS — Z11.59 NEED FOR HEPATITIS C SCREENING TEST: ICD-10-CM

## 2022-04-29 DIAGNOSIS — Z00.00 ANNUAL PHYSICAL EXAM: ICD-10-CM

## 2022-04-29 DIAGNOSIS — M79.662 PAIN IN BOTH LOWER LEGS: ICD-10-CM

## 2022-04-29 DIAGNOSIS — M79.661 PAIN IN BOTH LOWER LEGS: ICD-10-CM

## 2022-04-29 DIAGNOSIS — E66.9 CLASS 1 OBESITY WITH BODY MASS INDEX (BMI) OF 30.0 TO 30.9 IN ADULT, UNSPECIFIED OBESITY TYPE, UNSPECIFIED WHETHER SERIOUS COMORBIDITY PRESENT: ICD-10-CM

## 2022-04-29 LAB
ALBUMIN SERPL-MCNC: 4.6 G/DL (ref 3.5–5.2)
ALP BLD-CCNC: 110 U/L (ref 40–129)
ALT SERPL-CCNC: 53 U/L (ref 0–40)
ANION GAP SERPL CALCULATED.3IONS-SCNC: 17 MMOL/L (ref 7–16)
AST SERPL-CCNC: 32 U/L (ref 0–39)
BILIRUB SERPL-MCNC: 0.4 MG/DL (ref 0–1.2)
BUN BLDV-MCNC: 16 MG/DL (ref 6–20)
CALCIUM SERPL-MCNC: 9.6 MG/DL (ref 8.6–10.2)
CHLORIDE BLD-SCNC: 107 MMOL/L (ref 98–107)
CHOLESTEROL, TOTAL: 197 MG/DL (ref 0–199)
CO2: 22 MMOL/L (ref 22–29)
CREAT SERPL-MCNC: 1 MG/DL (ref 0.7–1.2)
GFR AFRICAN AMERICAN: >60
GFR NON-AFRICAN AMERICAN: >60 ML/MIN/1.73
GLUCOSE BLD-MCNC: 107 MG/DL (ref 74–99)
HCT VFR BLD CALC: 45.9 % (ref 37–54)
HDLC SERPL-MCNC: 35 MG/DL
HEMOGLOBIN: 14.2 G/DL (ref 12.5–16.5)
LDL CHOLESTEROL CALCULATED: 143 MG/DL (ref 0–99)
MCH RBC QN AUTO: 26.9 PG (ref 26–35)
MCHC RBC AUTO-ENTMCNC: 30.9 % (ref 32–34.5)
MCV RBC AUTO: 87.1 FL (ref 80–99.9)
PDW BLD-RTO: 13.2 FL (ref 11.5–15)
PLATELET # BLD: 233 E9/L (ref 130–450)
PMV BLD AUTO: 12.3 FL (ref 7–12)
POTASSIUM SERPL-SCNC: 4 MMOL/L (ref 3.5–5)
RBC # BLD: 5.27 E12/L (ref 3.8–5.8)
SODIUM BLD-SCNC: 146 MMOL/L (ref 132–146)
TOTAL PROTEIN: 7.7 G/DL (ref 6.4–8.3)
TRIGL SERPL-MCNC: 96 MG/DL (ref 0–149)
VLDLC SERPL CALC-MCNC: 19 MG/DL
WBC # BLD: 8 E9/L (ref 4.5–11.5)

## 2022-04-29 PROCEDURE — 99395 PREV VISIT EST AGE 18-39: CPT | Performed by: STUDENT IN AN ORGANIZED HEALTH CARE EDUCATION/TRAINING PROGRAM

## 2022-04-29 ASSESSMENT — PATIENT HEALTH QUESTIONNAIRE - PHQ9
SUM OF ALL RESPONSES TO PHQ9 QUESTIONS 1 & 2: 0
SUM OF ALL RESPONSES TO PHQ QUESTIONS 1-9: 0
SUM OF ALL RESPONSES TO PHQ QUESTIONS 1-9: 0
1. LITTLE INTEREST OR PLEASURE IN DOING THINGS: 0
2. FEELING DOWN, DEPRESSED OR HOPELESS: 0
SUM OF ALL RESPONSES TO PHQ QUESTIONS 1-9: 0
SUM OF ALL RESPONSES TO PHQ QUESTIONS 1-9: 0

## 2022-05-02 LAB
HEPATITIS C ANTIBODY INTERPRETATION: NORMAL
HIV-1 AND HIV-2 ANTIBODIES: NORMAL

## 2022-07-07 ENCOUNTER — OFFICE VISIT (OUTPATIENT)
Dept: PRIMARY CARE CLINIC | Age: 22
End: 2022-07-07
Payer: COMMERCIAL

## 2022-07-07 VITALS
OXYGEN SATURATION: 100 % | BODY MASS INDEX: 31.76 KG/M2 | WEIGHT: 214.4 LBS | TEMPERATURE: 96.8 F | HEIGHT: 69 IN | DIASTOLIC BLOOD PRESSURE: 60 MMHG | SYSTOLIC BLOOD PRESSURE: 122 MMHG | HEART RATE: 74 BPM | RESPIRATION RATE: 18 BRPM

## 2022-07-07 DIAGNOSIS — K52.9 GASTROENTERITIS: Primary | ICD-10-CM

## 2022-07-07 PROCEDURE — 99213 OFFICE O/P EST LOW 20 MIN: CPT | Performed by: STUDENT IN AN ORGANIZED HEALTH CARE EDUCATION/TRAINING PROGRAM

## 2022-07-07 RX ORDER — ONDANSETRON 4 MG/1
4 TABLET, FILM COATED ORAL 3 TIMES DAILY PRN
Qty: 15 TABLET | Refills: 0 | Status: SHIPPED | OUTPATIENT
Start: 2022-07-07

## 2022-07-07 RX ORDER — FAMOTIDINE 20 MG/1
20 TABLET, FILM COATED ORAL DAILY
Qty: 60 TABLET | Refills: 0 | Status: SHIPPED | OUTPATIENT
Start: 2022-07-07

## 2022-07-07 SDOH — ECONOMIC STABILITY: FOOD INSECURITY: WITHIN THE PAST 12 MONTHS, YOU WORRIED THAT YOUR FOOD WOULD RUN OUT BEFORE YOU GOT MONEY TO BUY MORE.: NEVER TRUE

## 2022-07-07 SDOH — ECONOMIC STABILITY: FOOD INSECURITY: WITHIN THE PAST 12 MONTHS, THE FOOD YOU BOUGHT JUST DIDN'T LAST AND YOU DIDN'T HAVE MONEY TO GET MORE.: NEVER TRUE

## 2022-07-07 ASSESSMENT — SOCIAL DETERMINANTS OF HEALTH (SDOH): HOW HARD IS IT FOR YOU TO PAY FOR THE VERY BASICS LIKE FOOD, HOUSING, MEDICAL CARE, AND HEATING?: NOT HARD AT ALL

## 2022-07-07 NOTE — PROGRESS NOTES
[facility]  Department of Family Medicine      Patient:  Khushboo Sotelo 23 y.o. male     Date of Service: 7/7/22      Chief complaint:   Chief Complaint   Patient presents with    Diarrhea     since Friday     Nausea     sinceFriday         History ofPresent Illness   The patient is a 23 y.o. male  presented to the clinic with complaints as above. Diarrhea and nausea  -new issue  -started Friday evening after going out to eat  -has vomited three times since Friday  -having diarrhea daily, loose stool bowel movements 4 times a day, denies any bloody or black stools  -had low grade fever on July 4 which has resolved  -gave zofran which helps  -has been eating crackers and has been keeping those down since this morning  -not drinking much water   -is having periumbilical abdominal pain which is constant     Past Medical History:      Diagnosis Date    Bilateral calf pain     Difficulty walking 2/28/2018    GERD (gastroesophageal reflux disease)     IBS (irritable bowel syndrome)     Numbness and tingling     BOTH CALFS    Pain in both lower legs 2/28/2018    Popliteal artery stenosis (HCC)     Prematurity     Weakness of hand     GRIPP WEAKNESS & ALSO SHAKE WHEN GRIPPING       PastSurgical History:        Procedure Laterality Date    CIRCUMCISION      KNEE SURGERY      VASCULAR SURGERY         Allergies:    Patient has no known allergies.     Social History:   Social History     Socioeconomic History    Marital status: Single     Spouse name: Not on file    Number of children: Not on file    Years of education: Not on file    Highest education level: Not on file   Occupational History    Not on file   Tobacco Use    Smoking status: Never Smoker    Smokeless tobacco: Never Used   Substance and Sexual Activity    Alcohol use: No    Drug use: No    Sexual activity: Not on file   Other Topics Concern    Not on file   Social History Narrative    Not on file     Social Determinants of Health     Financial Resource Strain: Low Risk     Difficulty of Paying Living Expenses: Not hard at all   Food Insecurity: No Food Insecurity    Worried About Running Out of Food in the Last Year: Never true    Tracey of Food in the Last Year: Never true   Transportation Needs:     Lack of Transportation (Medical): Not on file    Lack of Transportation (Non-Medical): Not on file   Physical Activity:     Days of Exercise per Week: Not on file    Minutes of Exercise per Session: Not on file   Stress:     Feeling of Stress : Not on file   Social Connections:     Frequency of Communication with Friends and Family: Not on file    Frequency of Social Gatherings with Friends and Family: Not on file    Attends Jewish Services: Not on file    Active Member of [employer] or Organizations: Not on file    Attends Club or Organization Meetings: Not on file    Marital Status: Not on file   Intimate Partner Violence:     Fear of Current or Ex-Partner: Not on file    Emotionally Abused: Not on file    Physically Abused: Not on file    Sexually Abused: Not on file   Housing Stability:     Unable to Pay for Housing in the Last Year: Not on file    Number of [place] in the Last Year: Not on file    Unstable Housing in the Last Year: Not on file        Family History:       Problem Relation Age of Onset    Asthma Mother     High Blood Pressure Father        Review of Systems:   Review of Systems - as above     Physical Exam   Vitals: /60   Pulse 74   Temp 96.8 °F (36 °C)   Resp 18   Ht 5' 9\" (1.753 m)   Wt 214 lb 6.4 oz (97.3 kg)   SpO2 100%   BMI 31.66 kg/m²   Physical Exam  Constitutional:       Appearance: He is well-developed. HENT:      Head: Normocephalic and atraumatic. Eyes:      General:         Right eye: No discharge. Left eye: No discharge. Conjunctiva/sclera: Conjunctivae normal.   Neck:      Trachea: No tracheal deviation.    Cardiovascular: Rate and Rhythm: Normal rate and regular rhythm. Heart sounds: Normal heart sounds. Pulmonary:      Effort: Pulmonary effort is normal. No respiratory distress. Breath sounds: Normal breath sounds. No wheezing. Abdominal:      General: Bowel sounds are normal. There is no distension. Palpations: Abdomen is soft. Tenderness: There is abdominal tenderness (periumbilical, mild). Musculoskeletal:         General: No tenderness. Cervical back: Normal range of motion and neck supple. Skin:     General: Skin is warm and dry. Neurological:      Mental Status: He is alert. Psychiatric:         Behavior: Behavior normal.           Assessment and Plan       1. Gastroenteritis  New issue  -Given symptoms, seems most likely viral, vitals stable and weight stable, will treat supportively for now with below medications and advised staying well hydrated, any worsening let me know   - famotidine (PEPCID) 20 MG tablet; Take 1 tablet by mouth daily  Dispense: 60 tablet; Refill: 0  - ondansetron (ZOFRAN) 4 MG tablet; Take 1 tablet by mouth 3 times daily as needed for Nausea or Vomiting  Dispense: 15 tablet; Refill: 0      Counseled regarding above diagnosis, including possible risks and complications,  especially if left uncontrolled. Counseled regarding the possible side effects, risks, benefits and alternatives to treatment;patient and/or guardian verbalizes understanding, agrees, feels comfortable with and wishes to proceed with above treatment plan. Call or go to [address] [facility] if symptoms worsen or persist. Advised patient to call with any new medication issues, and, as applicable, read all Rx info from pharmacy to assure aware of all possible risks and side effects of medicationbefore taking. Patient and/or guardian given opportunity to ask questions/raise concerns. The patient verbalized comfort and understanding ofinstructions.     I encourage further reading and education about your health conditions. Information on many health conditions is provided by [facility] Academy of Family Physicians: [email or web address]  Please bring any questions to me at your nextvisit. Return to Office: Return if symptoms worsen or fail to improve. Medication List:    Current Outpatient Medications   Medication Sig Dispense Refill    famotidine (PEPCID) 20 MG tablet Take 1 tablet by mouth daily 60 tablet 0    ondansetron (ZOFRAN) 4 MG tablet Take 1 tablet by mouth 3 times daily as needed for Nausea or Vomiting 15 tablet 0    Multiple Vitamins-Minerals (THERAPEUTIC MULTIVITAMIN-MINERALS) tablet Take 1 tablet by mouth daily      naproxen (NAPROSYN) 500 MG tablet Take 1 tablet by mouth 2 times daily for 7 days (Patient not taking: Reported on 7/7/2022) 14 tablet 0    aspirin EC 81 MG EC tablet Take 81 mg by mouth daily (Patient not taking: Reported on 3/18/2022)       No current facility-administered medications for this visit. Mike Rivas, DO       This document may have been prepared at least partially through the use of voice recognition software. Although effort is taken to assure the accuracy ofthis document, it is possible that grammatical, syntax,  or spelling errors may occur.

## 2023-01-28 DIAGNOSIS — K52.9 GASTROENTERITIS: ICD-10-CM

## 2023-01-30 RX ORDER — FAMOTIDINE 20 MG/1
TABLET, FILM COATED ORAL
Qty: 60 TABLET | Refills: 0 | Status: SHIPPED | OUTPATIENT
Start: 2023-01-30

## 2023-05-05 ENCOUNTER — OFFICE VISIT (OUTPATIENT)
Dept: PRIMARY CARE CLINIC | Age: 23
End: 2023-05-05
Payer: COMMERCIAL

## 2023-05-05 VITALS
OXYGEN SATURATION: 98 % | DIASTOLIC BLOOD PRESSURE: 72 MMHG | BODY MASS INDEX: 33.18 KG/M2 | RESPIRATION RATE: 17 BRPM | HEIGHT: 69 IN | HEART RATE: 91 BPM | TEMPERATURE: 97.1 F | WEIGHT: 224 LBS | SYSTOLIC BLOOD PRESSURE: 102 MMHG

## 2023-05-05 DIAGNOSIS — E66.9 CLASS 1 OBESITY WITH BODY MASS INDEX (BMI) OF 30.0 TO 30.9 IN ADULT, UNSPECIFIED OBESITY TYPE, UNSPECIFIED WHETHER SERIOUS COMORBIDITY PRESENT: ICD-10-CM

## 2023-05-05 DIAGNOSIS — Z00.00 ANNUAL PHYSICAL EXAM: Primary | ICD-10-CM

## 2023-05-05 DIAGNOSIS — Z00.00 ANNUAL PHYSICAL EXAM: ICD-10-CM

## 2023-05-05 LAB
ALBUMIN SERPL-MCNC: 4.5 G/DL (ref 3.5–5.2)
ALP SERPL-CCNC: 99 U/L (ref 40–129)
ALT SERPL-CCNC: 33 U/L (ref 0–40)
ANION GAP SERPL CALCULATED.3IONS-SCNC: 12 MMOL/L (ref 7–16)
AST SERPL-CCNC: 22 U/L (ref 0–39)
BILIRUB SERPL-MCNC: 0.6 MG/DL (ref 0–1.2)
BUN SERPL-MCNC: 12 MG/DL (ref 6–20)
CALCIUM SERPL-MCNC: 9.8 MG/DL (ref 8.6–10.2)
CHLORIDE SERPL-SCNC: 103 MMOL/L (ref 98–107)
CHOLESTEROL, TOTAL: 187 MG/DL (ref 0–199)
CO2 SERPL-SCNC: 27 MMOL/L (ref 22–29)
CREAT SERPL-MCNC: 1.1 MG/DL (ref 0.7–1.2)
ERYTHROCYTE [DISTWIDTH] IN BLOOD BY AUTOMATED COUNT: 13.4 FL (ref 11.5–15)
GLUCOSE SERPL-MCNC: 96 MG/DL (ref 74–99)
HCT VFR BLD AUTO: 49.3 % (ref 37–54)
HDLC SERPL-MCNC: 32 MG/DL
HGB BLD-MCNC: 14.6 G/DL (ref 12.5–16.5)
LDLC SERPL CALC-MCNC: 131 MG/DL (ref 0–99)
MCH RBC QN AUTO: 27.1 PG (ref 26–35)
MCHC RBC AUTO-ENTMCNC: 29.6 % (ref 32–34.5)
MCV RBC AUTO: 91.6 FL (ref 80–99.9)
PLATELET # BLD AUTO: 223 E9/L (ref 130–450)
PMV BLD AUTO: 12.6 FL (ref 7–12)
POTASSIUM SERPL-SCNC: 4.7 MMOL/L (ref 3.5–5)
PROT SERPL-MCNC: 7.6 G/DL (ref 6.4–8.3)
RBC # BLD AUTO: 5.38 E12/L (ref 3.8–5.8)
SODIUM SERPL-SCNC: 142 MMOL/L (ref 132–146)
TRIGL SERPL-MCNC: 121 MG/DL (ref 0–149)
TSH SERPL-MCNC: 3.5 UIU/ML (ref 0.27–4.2)
VLDLC SERPL CALC-MCNC: 24 MG/DL
WBC # BLD: 9.5 E9/L (ref 4.5–11.5)

## 2023-05-05 PROCEDURE — 99395 PREV VISIT EST AGE 18-39: CPT | Performed by: STUDENT IN AN ORGANIZED HEALTH CARE EDUCATION/TRAINING PROGRAM

## 2023-05-05 SDOH — ECONOMIC STABILITY: FOOD INSECURITY: WITHIN THE PAST 12 MONTHS, THE FOOD YOU BOUGHT JUST DIDN'T LAST AND YOU DIDN'T HAVE MONEY TO GET MORE.: NEVER TRUE

## 2023-05-05 SDOH — ECONOMIC STABILITY: HOUSING INSECURITY
IN THE LAST 12 MONTHS, WAS THERE A TIME WHEN YOU DID NOT HAVE A STEADY PLACE TO SLEEP OR SLEPT IN A SHELTER (INCLUDING NOW)?: NO

## 2023-05-05 SDOH — ECONOMIC STABILITY: FOOD INSECURITY: WITHIN THE PAST 12 MONTHS, YOU WORRIED THAT YOUR FOOD WOULD RUN OUT BEFORE YOU GOT MONEY TO BUY MORE.: NEVER TRUE

## 2023-05-05 SDOH — ECONOMIC STABILITY: INCOME INSECURITY: HOW HARD IS IT FOR YOU TO PAY FOR THE VERY BASICS LIKE FOOD, HOUSING, MEDICAL CARE, AND HEATING?: NOT HARD AT ALL

## 2023-05-05 ASSESSMENT — PATIENT HEALTH QUESTIONNAIRE - PHQ9
SUM OF ALL RESPONSES TO PHQ QUESTIONS 1-9: 0
SUM OF ALL RESPONSES TO PHQ QUESTIONS 1-9: 0
2. FEELING DOWN, DEPRESSED OR HOPELESS: 0
SUM OF ALL RESPONSES TO PHQ QUESTIONS 1-9: 0
SUM OF ALL RESPONSES TO PHQ9 QUESTIONS 1 & 2: 0
1. LITTLE INTEREST OR PLEASURE IN DOING THINGS: 0
SUM OF ALL RESPONSES TO PHQ QUESTIONS 1-9: 0

## 2023-05-05 NOTE — PROGRESS NOTES
[facility]  Department of Family Medicine      Patient:  Wayne Blackman 20 y.o. male     Date of Service: 5/5/23      Chief complaint:   Chief Complaint   Patient presents with    Annual Exam         History ofPresent Illness   The patient is a 20 y.o. male  presented to the clinic with complaints as above. Annual exam  -still in school, going well     Compartment syndrome   -f/u  -seeing specialist for this, pain is a little better    HLD  -f/u  -has been watching diet    Denies any chest pain, lightheadedness, or dizziness     Past Medical History:      Diagnosis Date    Bilateral calf pain     Difficulty walking 2/28/2018    GERD (gastroesophageal reflux disease)     IBS (irritable bowel syndrome)     Numbness and tingling     BOTH CALFS    Pain in both lower legs 2/28/2018    Popliteal artery stenosis (HCC)     Prematurity     Weakness of hand     GRIPP WEAKNESS & ALSO SHAKE WHEN GRIPPING       PastSurgical History:        Procedure Laterality Date    CIRCUMCISION      KNEE SURGERY      VASCULAR SURGERY         Allergies:    Patient has no known allergies.     Social History:   Social History     Socioeconomic History    Marital status: Single     Spouse name: Not on file    Number of children: Not on file    Years of education: Not on file    Highest education level: Not on file   Occupational History    Not on file   Tobacco Use    Smoking status: Never    Smokeless tobacco: Never   Substance and Sexual Activity    Alcohol use: No    Drug use: No    Sexual activity: Not on file   Other Topics Concern    Not on file   Social History Narrative    Not on file     Social Determinants of Health     Financial Resource Strain: Low Risk     Difficulty of Paying Living Expenses: Not hard at all   Food Insecurity: No Food Insecurity    Worried About Running Out of Food in the Last Year: Never true    [address] in the Last Year: Never true   Transportation Needs: Unknown    Lack of

## 2023-05-08 ENCOUNTER — TELEPHONE (OUTPATIENT)
Dept: PRIMARY CARE CLINIC | Age: 23
End: 2023-05-08

## 2023-05-08 NOTE — TELEPHONE ENCOUNTER
Pt returned call regarding most recent labs. Read to pt Dr Billy Clifton result notes.  Pt verbalized understanding and no further questions

## 2023-07-05 NOTE — PROGRESS NOTES
[facility]  Department of Family Medicine      Patient:  Beena Velásquez 20 y.o. male     Date of Service: 7/6/23      Chief complaint:   Chief Complaint   Patient presents with    Follow-up     anxiety         History ofPresent Illness   The patient is a 20 y.o. male  presented to the clinic with complaints as above. Anxiety  -chronic issue  -not worsening, just bothersome for him  -has a lot of social anxiety with talking to people, even online  -has never been on medication for this   -having some mild depression with it     Past Medical History:      Diagnosis Date    Bilateral calf pain     Difficulty walking 2/28/2018    GERD (gastroesophageal reflux disease)     IBS (irritable bowel syndrome)     Numbness and tingling     BOTH CALFS    Pain in both lower legs 2/28/2018    Popliteal artery stenosis (HCC)     Prematurity     Weakness of hand     GRIPP WEAKNESS & ALSO SHAKE WHEN GRIPPING       PastSurgical History:        Procedure Laterality Date    CIRCUMCISION      KNEE SURGERY      VASCULAR SURGERY         Allergies:    Patient has no known allergies.     Social History:   Social History     Socioeconomic History    Marital status: Single     Spouse name: Not on file    Number of children: Not on file    Years of education: Not on file    Highest education level: Not on file   Occupational History    Not on file   Tobacco Use    Smoking status: Never    Smokeless tobacco: Never   Substance and Sexual Activity    Alcohol use: No    Drug use: No    Sexual activity: Not on file   Other Topics Concern    Not on file   Social History Narrative    Not on file     Social Determinants of Health     Financial Resource Strain: Low Risk     Difficulty of Paying Living Expenses: Not hard at all   Food Insecurity: No Food Insecurity    Worried About Running Out of Food in the Last Year: Never true    [address] in the Last Year: Never true   Transportation Needs: Unknown    Lack of

## 2023-07-06 ENCOUNTER — OFFICE VISIT (OUTPATIENT)
Dept: PRIMARY CARE CLINIC | Age: 23
End: 2023-07-06
Payer: COMMERCIAL

## 2023-07-06 VITALS
TEMPERATURE: 96.9 F | SYSTOLIC BLOOD PRESSURE: 112 MMHG | OXYGEN SATURATION: 99 % | BODY MASS INDEX: 32.73 KG/M2 | WEIGHT: 221 LBS | DIASTOLIC BLOOD PRESSURE: 74 MMHG | HEART RATE: 92 BPM | HEIGHT: 69 IN

## 2023-07-06 DIAGNOSIS — F41.9 ANXIETY AND DEPRESSION: Primary | ICD-10-CM

## 2023-07-06 DIAGNOSIS — F32.A ANXIETY AND DEPRESSION: Primary | ICD-10-CM

## 2023-07-06 PROCEDURE — 99213 OFFICE O/P EST LOW 20 MIN: CPT | Performed by: STUDENT IN AN ORGANIZED HEALTH CARE EDUCATION/TRAINING PROGRAM

## 2023-07-06 RX ORDER — CITALOPRAM 10 MG/1
10 TABLET ORAL DAILY
Qty: 30 TABLET | Refills: 0 | Status: SHIPPED | OUTPATIENT
Start: 2023-07-06

## 2023-08-08 ENCOUNTER — OFFICE VISIT (OUTPATIENT)
Dept: PRIMARY CARE CLINIC | Age: 23
End: 2023-08-08
Payer: COMMERCIAL

## 2023-08-08 VITALS
SYSTOLIC BLOOD PRESSURE: 100 MMHG | DIASTOLIC BLOOD PRESSURE: 62 MMHG | TEMPERATURE: 97.1 F | OXYGEN SATURATION: 99 % | BODY MASS INDEX: 30.1 KG/M2 | RESPIRATION RATE: 18 BRPM | HEART RATE: 89 BPM | WEIGHT: 215 LBS | HEIGHT: 71 IN

## 2023-08-08 DIAGNOSIS — F32.A ANXIETY AND DEPRESSION: ICD-10-CM

## 2023-08-08 DIAGNOSIS — F41.9 ANXIETY AND DEPRESSION: ICD-10-CM

## 2023-08-08 PROCEDURE — 99213 OFFICE O/P EST LOW 20 MIN: CPT | Performed by: STUDENT IN AN ORGANIZED HEALTH CARE EDUCATION/TRAINING PROGRAM

## 2023-08-08 RX ORDER — CITALOPRAM 20 MG/1
20 TABLET ORAL DAILY
Qty: 30 TABLET | Refills: 0 | Status: SHIPPED | OUTPATIENT
Start: 2023-08-08

## 2023-09-01 ENCOUNTER — TELEPHONE (OUTPATIENT)
Dept: PRIMARY CARE CLINIC | Age: 23
End: 2023-09-01

## 2023-09-01 NOTE — TELEPHONE ENCOUNTER
Patients parent called pt has been nauseous for a few days  Their questions is , is it from the increase in medication from last visit  citalopram (CELEXA) 20 MG tablet       Asking can pt be seen today , no available appts at this time

## 2023-09-01 NOTE — TELEPHONE ENCOUNTER
Mother notified. Verbalized understanding. Patient has an appointment 9/8.  Will follow up and discuss then

## 2023-09-01 NOTE — TELEPHONE ENCOUNTER
There are a lot of illnesses going around, he could have a viral illness, however this medication can make a person feel nauseous, if so then they may get over this with time.     Best regards,  Jorge Luis Catalan

## 2023-09-08 ENCOUNTER — OFFICE VISIT (OUTPATIENT)
Dept: PRIMARY CARE CLINIC | Age: 23
End: 2023-09-08
Payer: COMMERCIAL

## 2023-09-08 VITALS
RESPIRATION RATE: 17 BRPM | SYSTOLIC BLOOD PRESSURE: 110 MMHG | OXYGEN SATURATION: 97 % | WEIGHT: 213 LBS | HEART RATE: 88 BPM | DIASTOLIC BLOOD PRESSURE: 70 MMHG | HEIGHT: 69 IN | BODY MASS INDEX: 31.55 KG/M2 | TEMPERATURE: 97.5 F

## 2023-09-08 DIAGNOSIS — K21.9 GASTROESOPHAGEAL REFLUX DISEASE, UNSPECIFIED WHETHER ESOPHAGITIS PRESENT: Primary | ICD-10-CM

## 2023-09-08 DIAGNOSIS — F32.A ANXIETY AND DEPRESSION: ICD-10-CM

## 2023-09-08 DIAGNOSIS — F41.9 ANXIETY AND DEPRESSION: ICD-10-CM

## 2023-09-08 PROCEDURE — 99214 OFFICE O/P EST MOD 30 MIN: CPT | Performed by: STUDENT IN AN ORGANIZED HEALTH CARE EDUCATION/TRAINING PROGRAM

## 2023-09-08 RX ORDER — CITALOPRAM HYDROBROMIDE 10 MG/1
10 TABLET ORAL DAILY
Qty: 30 TABLET | Refills: 1 | Status: SHIPPED | OUTPATIENT
Start: 2023-09-08

## 2023-09-08 RX ORDER — FAMOTIDINE 20 MG/1
20 TABLET, FILM COATED ORAL 2 TIMES DAILY
Qty: 60 TABLET | Refills: 3 | Status: SHIPPED | OUTPATIENT
Start: 2023-09-08

## 2023-09-08 NOTE — PROGRESS NOTES
[facility]  Department of Family Medicine      Patient:  Prasad Park 20 y.o. male     Date of Service: 9/8/23      Chief complaint:   Chief Complaint   Patient presents with    Anxiety    Depression         History ofPresent Illness   The patient is a 20 y.o. male  presented to the clinic with complaints as above. Anxiety  -f/u  -increased celexa during last visit,   -currently, feeling like he is getting abdominal pain, headaches  -all these symptoms started after the increase in celexa   -denies any HI or SI     GERD  -f/u  -pepcid was helping    Past Medical History:      Diagnosis Date    Bilateral calf pain     Difficulty walking 2/28/2018    GERD (gastroesophageal reflux disease)     IBS (irritable bowel syndrome)     Numbness and tingling     BOTH CALFS    Pain in both lower legs 2/28/2018    Popliteal artery stenosis (HCC)     Prematurity     Weakness of hand     GRIPP WEAKNESS & ALSO SHAKE WHEN GRIPPING       PastSurgical History:        Procedure Laterality Date    CIRCUMCISION      KNEE SURGERY      VASCULAR SURGERY         Allergies:    Patient has no known allergies.     Social History:   Social History     Socioeconomic History    Marital status: Single     Spouse name: Not on file    Number of children: Not on file    Years of education: Not on file    Highest education level: Not on file   Occupational History    Not on file   Tobacco Use    Smoking status: Never    Smokeless tobacco: Never   Substance and Sexual Activity    Alcohol use: No    Drug use: No    Sexual activity: Not on file   Other Topics Concern    Not on file   Social History Narrative    Not on file     Social Determinants of Health     Financial Resource Strain: Low Risk     Difficulty of Paying Living Expenses: Not hard at all   Food Insecurity: No Food Insecurity    Worried About Running Out of Food in the Last Year: Never true    [address] in the Last Year: Never true   Transportation Needs:

## 2023-09-11 ENCOUNTER — OFFICE VISIT (OUTPATIENT)
Dept: PRIMARY CARE CLINIC | Age: 23
End: 2023-09-11
Payer: COMMERCIAL

## 2023-09-11 VITALS
WEIGHT: 220 LBS | RESPIRATION RATE: 20 BRPM | BODY MASS INDEX: 32.58 KG/M2 | HEART RATE: 112 BPM | SYSTOLIC BLOOD PRESSURE: 134 MMHG | OXYGEN SATURATION: 98 % | HEIGHT: 69 IN | TEMPERATURE: 97.3 F | DIASTOLIC BLOOD PRESSURE: 87 MMHG

## 2023-09-11 DIAGNOSIS — R05.9 COUGH IN ADULT: ICD-10-CM

## 2023-09-11 DIAGNOSIS — J06.9 ACUTE URI: Primary | ICD-10-CM

## 2023-09-11 LAB
INFLUENZA A ANTIGEN, POC: NEGATIVE
INFLUENZA B ANTIGEN, POC: NEGATIVE
LOT EXPIRE DATE: NORMAL
LOT KIT NUMBER: NORMAL
S PYO AG THROAT QL: NORMAL
SARS-COV-2, POC: NORMAL
VALID INTERNAL CONTROL: NORMAL
VENDOR AND KIT NAME POC: NORMAL

## 2023-09-11 PROCEDURE — 87428 SARSCOV & INF VIR A&B AG IA: CPT | Performed by: NURSE PRACTITIONER

## 2023-09-11 PROCEDURE — 99213 OFFICE O/P EST LOW 20 MIN: CPT | Performed by: NURSE PRACTITIONER

## 2023-09-11 PROCEDURE — 87880 STREP A ASSAY W/OPTIC: CPT | Performed by: NURSE PRACTITIONER

## 2023-09-11 RX ORDER — PREDNISONE 10 MG/1
10 TABLET ORAL 2 TIMES DAILY
Qty: 6 TABLET | Refills: 0 | Status: SHIPPED | OUTPATIENT
Start: 2023-09-11 | End: 2023-09-14

## 2023-09-11 RX ORDER — BROMPHENIRAMINE MALEATE, PSEUDOEPHEDRINE HYDROCHLORIDE, AND DEXTROMETHORPHAN HYDROBROMIDE 2; 30; 10 MG/5ML; MG/5ML; MG/5ML
5 SYRUP ORAL 4 TIMES DAILY PRN
Qty: 118 ML | Refills: 0 | Status: SHIPPED | OUTPATIENT
Start: 2023-09-11

## 2023-09-11 RX ORDER — CEFDINIR 300 MG/1
300 CAPSULE ORAL 2 TIMES DAILY
Qty: 20 CAPSULE | Refills: 0 | Status: SHIPPED | OUTPATIENT
Start: 2023-09-11 | End: 2023-09-21

## 2023-10-06 ENCOUNTER — OFFICE VISIT (OUTPATIENT)
Dept: PRIMARY CARE CLINIC | Age: 23
End: 2023-10-06
Payer: COMMERCIAL

## 2023-10-06 VITALS
RESPIRATION RATE: 17 BRPM | HEART RATE: 129 BPM | BODY MASS INDEX: 30.94 KG/M2 | WEIGHT: 221 LBS | HEIGHT: 71 IN | SYSTOLIC BLOOD PRESSURE: 118 MMHG | DIASTOLIC BLOOD PRESSURE: 62 MMHG | OXYGEN SATURATION: 96 % | TEMPERATURE: 96.4 F

## 2023-10-06 DIAGNOSIS — F32.A ANXIETY AND DEPRESSION: ICD-10-CM

## 2023-10-06 DIAGNOSIS — B96.89 ACUTE BACTERIAL SINUSITIS: ICD-10-CM

## 2023-10-06 DIAGNOSIS — J01.90 ACUTE BACTERIAL SINUSITIS: ICD-10-CM

## 2023-10-06 DIAGNOSIS — F41.9 ANXIETY AND DEPRESSION: ICD-10-CM

## 2023-10-06 DIAGNOSIS — K21.9 GASTROESOPHAGEAL REFLUX DISEASE, UNSPECIFIED WHETHER ESOPHAGITIS PRESENT: Primary | ICD-10-CM

## 2023-10-06 PROCEDURE — 99214 OFFICE O/P EST MOD 30 MIN: CPT | Performed by: STUDENT IN AN ORGANIZED HEALTH CARE EDUCATION/TRAINING PROGRAM

## 2023-10-06 RX ORDER — AMOXICILLIN AND CLAVULANATE POTASSIUM 875; 125 MG/1; MG/1
1 TABLET, FILM COATED ORAL 2 TIMES DAILY
Qty: 20 TABLET | Refills: 0 | Status: SHIPPED | OUTPATIENT
Start: 2023-10-06 | End: 2023-10-16

## 2023-10-06 RX ORDER — AMOXICILLIN AND CLAVULANATE POTASSIUM 875; 125 MG/1; MG/1
1 TABLET, FILM COATED ORAL 2 TIMES DAILY
Qty: 14 TABLET | Refills: 0 | Status: SHIPPED
Start: 2023-10-06 | End: 2023-10-06

## 2023-10-06 RX ORDER — ONDANSETRON 4 MG/1
4 TABLET, FILM COATED ORAL 3 TIMES DAILY PRN
Qty: 30 TABLET | Refills: 0 | Status: SHIPPED | OUTPATIENT
Start: 2023-10-06

## 2023-10-06 RX ORDER — BUSPIRONE HYDROCHLORIDE 5 MG/1
5 TABLET ORAL 2 TIMES DAILY
Qty: 60 TABLET | Refills: 0 | Status: SHIPPED | OUTPATIENT
Start: 2023-10-06 | End: 2023-11-05

## 2023-10-06 RX ORDER — PANTOPRAZOLE SODIUM 40 MG/1
40 TABLET, DELAYED RELEASE ORAL
Qty: 30 TABLET | Refills: 3 | Status: SHIPPED | OUTPATIENT
Start: 2023-10-06

## 2023-10-06 NOTE — PROGRESS NOTES
aware of all possible risks and side effects of medicationbefore taking. Patient and/or guardian given opportunity to ask questions/raise concerns. The patient verbalized comfort and understanding ofinstructions. I encourage further reading and education about your health conditions. Information on many health conditions is provided by [address] [employer] of [employer]icians: [email or web address]  Please bring any questions to me at your nextvisit. Return to Office: Return in about 2 weeks (around 10/20/2023) for f/u abs and mood and gerd . Medication List:    Current Outpatient Medications   Medication Sig Dispense Refill    pantoprazole (PROTONIX) 40 MG tablet Take 1 tablet by mouth daily (with breakfast) 30 tablet 3    busPIRone (BUSPAR) 5 MG tablet Take 1 tablet by mouth 2 times daily 60 tablet 0    amoxicillin-clavulanate (AUGMENTIN) 875-125 MG per tablet Take 1 tablet by mouth 2 times daily for 10 days 20 tablet 0    ondansetron (ZOFRAN) 4 MG tablet Take 1 tablet by mouth 3 times daily as needed for Nausea or Vomiting 30 tablet 0    citalopram (CELEXA) 10 MG tablet Take 1 tablet by mouth daily 30 tablet 1     No current facility-administered medications for this visit. Franca Watkins, DO       This document may have been prepared at least partially through the use of voice recognition software. Although effort is taken to assure the accuracy ofthis document, it is possible that grammatical, syntax,  or spelling errors may occur.

## 2023-10-09 DIAGNOSIS — J01.90 ACUTE BACTERIAL SINUSITIS: ICD-10-CM

## 2023-10-09 DIAGNOSIS — B96.89 ACUTE BACTERIAL SINUSITIS: ICD-10-CM

## 2023-10-09 NOTE — TELEPHONE ENCOUNTER
Patient only got 14 tabs of the Augmentin. The pharmacy didn't receive the second script that was sent. Please resend to [facility] for 3 days supply or resend the 10 day. Thank you.

## 2023-10-10 RX ORDER — AMOXICILLIN AND CLAVULANATE POTASSIUM 875; 125 MG/1; MG/1
1 TABLET, FILM COATED ORAL 2 TIMES DAILY
Qty: 20 TABLET | Refills: 0 | Status: SHIPPED | OUTPATIENT
Start: 2023-10-10 | End: 2023-10-20

## 2023-10-27 ENCOUNTER — OFFICE VISIT (OUTPATIENT)
Dept: PRIMARY CARE CLINIC | Age: 23
End: 2023-10-27
Payer: COMMERCIAL

## 2023-10-27 VITALS
HEART RATE: 86 BPM | BODY MASS INDEX: 30.94 KG/M2 | SYSTOLIC BLOOD PRESSURE: 106 MMHG | OXYGEN SATURATION: 98 % | RESPIRATION RATE: 18 BRPM | DIASTOLIC BLOOD PRESSURE: 69 MMHG | TEMPERATURE: 97.2 F | WEIGHT: 221 LBS | HEIGHT: 71 IN

## 2023-10-27 DIAGNOSIS — R53.81 MALAISE AND FATIGUE: Primary | ICD-10-CM

## 2023-10-27 DIAGNOSIS — J02.9 SORE THROAT: ICD-10-CM

## 2023-10-27 DIAGNOSIS — R53.83 MALAISE AND FATIGUE: ICD-10-CM

## 2023-10-27 DIAGNOSIS — R11.0 NAUSEA: ICD-10-CM

## 2023-10-27 DIAGNOSIS — R53.83 MALAISE AND FATIGUE: Primary | ICD-10-CM

## 2023-10-27 DIAGNOSIS — R53.81 MALAISE AND FATIGUE: ICD-10-CM

## 2023-10-27 LAB
ABSOLUTE IMMATURE GRANULOCYTE: 0.03 K/UL (ref 0–0.58)
BASOPHILS ABSOLUTE: 0.03 K/UL (ref 0–0.2)
BASOPHILS RELATIVE PERCENT: 0 % (ref 0–2)
EOSINOPHILS ABSOLUTE: 0.11 K/UL (ref 0.05–0.5)
EOSINOPHILS RELATIVE PERCENT: 1 % (ref 0–6)
HCT VFR BLD CALC: 46.5 % (ref 37–54)
HEMOGLOBIN: 14.6 G/DL (ref 12.5–16.5)
IMMATURE GRANULOCYTES: 0 % (ref 0–5)
LYMPHOCYTES ABSOLUTE: 2.29 K/UL (ref 1.5–4)
LYMPHOCYTES RELATIVE PERCENT: 28 % (ref 20–42)
MCH RBC QN AUTO: 27.9 PG (ref 26–35)
MCHC RBC AUTO-ENTMCNC: 31.4 G/DL (ref 32–34.5)
MCV RBC AUTO: 88.9 FL (ref 80–99.9)
MONOCYTES ABSOLUTE: 0.41 K/UL (ref 0.1–0.95)
MONOCYTES RELATIVE PERCENT: 5 % (ref 2–12)
NEUTROPHILS ABSOLUTE: 5.36 K/UL (ref 1.8–7.3)
NEUTROPHILS RELATIVE PERCENT: 65 % (ref 43–80)
PDW BLD-RTO: 13.9 % (ref 11.5–15)
PLATELET # BLD: 249 K/UL (ref 130–450)
PMV BLD AUTO: 12.7 FL (ref 7–12)
RBC # BLD: 5.23 M/UL (ref 3.8–5.8)
WBC # BLD: 8.2 K/UL (ref 4.5–11.5)

## 2023-10-27 PROCEDURE — 99213 OFFICE O/P EST LOW 20 MIN: CPT | Performed by: NURSE PRACTITIONER

## 2023-10-27 RX ORDER — ONDANSETRON 4 MG/1
4 TABLET, ORALLY DISINTEGRATING ORAL 3 TIMES DAILY PRN
Qty: 12 TABLET | Refills: 0 | Status: SHIPPED | OUTPATIENT
Start: 2023-10-27

## 2023-10-27 NOTE — PROGRESS NOTES
Chief Complaint:   Pharyngitis, Headache, and Emesis      History of Present Illness   Source of history provided by:  patient. Farooq Vick is a 20 y.o. old male with a past medical history of:   Past Medical History:   Diagnosis Date    Bilateral calf pain     Difficulty walking 2/28/2018    GERD (gastroesophageal reflux disease)     IBS (irritable bowel syndrome)     Numbness and tingling     BOTH CALFS    Pain in both lower legs 2/28/2018    Popliteal artery stenosis (HCC)     Prematurity     Weakness of hand     GRIPP WEAKNESS & ALSO SHAKE WHEN GRIPPING        Pt presents to the [facility] Care with a sore throat/headaches/fatigue and N/V for the past 3 days. Pt did not have any vomiting today. Pt is tolerating fluids. No fever noted. Pt was recently treated w/Augmentin by his PCP for Sinus Infection. Denies any diarrhea,  difficulty swallowing, cough, CP, progressive SOB, dizziness, or any other concerning issues. Mother is concerned about possible Mono      ROS    Unless otherwise stated in this report or unable to obtain because of the patient's clinical or mental status as evidenced by the medical record, this patients's positive and negative responses for Review of Systems, constitutional, psych, eyes, ENT, cardiovascular, respiratory, gastrointestinal, neurological, genitourinary, musculoskeletal, integument systems and systems related to the presenting problem are either stated in the preceding or were not pertinent or were negative for the symptoms and/or complaints related to the medical problem. Past Surgical History:  has a past surgical history that includes Circumcision; knee surgery; and vascular surgery. Social History:  reports that he has never smoked. He has never used smokeless tobacco. He reports that he does not drink alcohol and does not use drugs. Family History: family history includes Asthma in his mother; High Blood Pressure in his father.    Allergies: Patient has no known

## 2023-10-30 LAB — MONONUCLEOSIS SCREEN: NEGATIVE

## 2023-11-02 LAB
EBV EARLY ANTIGEN IGG: 27 U/ML
EBV INTERPRETATION: NORMAL
EBV NUCLEAR AG AB: 9 U/ML
EPSTEIN-BARR VCA IGG: 88 U/ML
EPSTEIN-BARR VCA IGM: 32 U/ML

## 2023-11-03 ENCOUNTER — OFFICE VISIT (OUTPATIENT)
Dept: PRIMARY CARE CLINIC | Age: 23
End: 2023-11-03
Payer: COMMERCIAL

## 2023-11-03 VITALS
RESPIRATION RATE: 16 BRPM | HEIGHT: 71 IN | SYSTOLIC BLOOD PRESSURE: 98 MMHG | BODY MASS INDEX: 30.94 KG/M2 | DIASTOLIC BLOOD PRESSURE: 60 MMHG | OXYGEN SATURATION: 96 % | TEMPERATURE: 96.8 F | HEART RATE: 95 BPM | WEIGHT: 221 LBS

## 2023-11-03 DIAGNOSIS — F32.A ANXIETY AND DEPRESSION: ICD-10-CM

## 2023-11-03 DIAGNOSIS — F41.9 ANXIETY AND DEPRESSION: ICD-10-CM

## 2023-11-03 DIAGNOSIS — A08.4 VIRAL GASTROENTERITIS: Primary | ICD-10-CM

## 2023-11-03 PROBLEM — F33.9 MAJOR DEPRESSIVE DISORDER, RECURRENT, UNSPECIFIED (HCC): Status: RESOLVED | Noted: 2023-11-03 | Resolved: 2023-11-03

## 2023-11-03 PROBLEM — F33.1 MAJOR DEPRESSIVE DISORDER, RECURRENT, MODERATE (HCC): Status: RESOLVED | Noted: 2023-11-03 | Resolved: 2023-11-03

## 2023-11-03 PROBLEM — F33.0 MAJOR DEPRESSIVE DISORDER, RECURRENT, MILD (HCC): Status: ACTIVE | Noted: 2023-11-03

## 2023-11-03 PROBLEM — F33.9 MAJOR DEPRESSIVE DISORDER, RECURRENT, UNSPECIFIED (HCC): Status: ACTIVE | Noted: 2023-11-03

## 2023-11-03 PROBLEM — F33.1 MAJOR DEPRESSIVE DISORDER, RECURRENT, MODERATE (HCC): Status: ACTIVE | Noted: 2023-11-03

## 2023-11-03 PROBLEM — F33.0 MAJOR DEPRESSIVE DISORDER, RECURRENT, MILD (HCC): Status: RESOLVED | Noted: 2023-11-03 | Resolved: 2023-11-03

## 2023-11-03 PROCEDURE — 99213 OFFICE O/P EST LOW 20 MIN: CPT | Performed by: STUDENT IN AN ORGANIZED HEALTH CARE EDUCATION/TRAINING PROGRAM

## 2023-11-03 NOTE — PROGRESS NOTES
Abdomen is soft. Tenderness: There is abdominal tenderness (minimal, RUQ, left quadrants). Musculoskeletal:         General: No tenderness. Cervical back: Normal range of motion and neck supple. Skin:     General: Skin is warm and dry. Neurological:      Mental Status: He is alert. Psychiatric:         Behavior: Behavior normal.             Assessment and Plan       1. Viral gastroenteritis  New issue  -Symptoms and PE findings suggestive of viral gastroenteritis, supportive care for now    2. Anxiety and depression  F/u of chronic issue  -Improving in office today, advised patient to maintain the benefit of buspar and celexa, brief overview of side effects discussed        Counseled regarding above diagnosis, including possible risks and complications,  especially if left uncontrolled. Counseled regarding the possible side effects, risks, benefits and alternatives to treatment;patient and/or guardian verbalizes understanding, agrees, feels comfortable with and wishes to proceed with above treatment plan. Call or go to [facility] if symptoms worsen or persist. Advised patient to call with any new medication issues, and, as applicable, read all Rx info from pharmacy to assure aware of all possible risks and side effects of medicationbefore taking. Patient and/or guardian given opportunity to ask questions/raise concerns. The patient verbalized comfort and understanding ofinstructions. I encourage further reading and education about your health conditions. Information on many health conditions is provided by [address]  [employer]: [email or web address] [email or web address]  Please bring any questions to me at your nextvisit. Return to Office: Return in about 1 month (around 12/3/2023) for f/u mood and belly pain .     Medication List:    Current Outpatient Medications   Medication Sig Dispense Refill    pantoprazole (PROTONIX) 40 MG tablet Take 1 tablet by mouth daily (with

## 2023-11-06 ENCOUNTER — TELEPHONE (OUTPATIENT)
Dept: PRIMARY CARE CLINIC | Age: 23
End: 2023-11-06

## 2023-11-06 DIAGNOSIS — R19.7 DIARRHEA, UNSPECIFIED TYPE: Primary | ICD-10-CM

## 2023-11-06 NOTE — TELEPHONE ENCOUNTER
Parent called asking for dr to order labs   Pt has had Diarrhea for over a week     C-diff   And stool culture

## 2023-11-27 ENCOUNTER — OFFICE VISIT (OUTPATIENT)
Dept: PRIMARY CARE CLINIC | Age: 23
End: 2023-11-27
Payer: COMMERCIAL

## 2023-11-27 VITALS
RESPIRATION RATE: 19 BRPM | HEART RATE: 90 BPM | HEIGHT: 71 IN | OXYGEN SATURATION: 97 % | DIASTOLIC BLOOD PRESSURE: 76 MMHG | SYSTOLIC BLOOD PRESSURE: 120 MMHG | WEIGHT: 228 LBS | BODY MASS INDEX: 31.92 KG/M2 | TEMPERATURE: 97.2 F

## 2023-11-27 DIAGNOSIS — J02.9 SORE THROAT: ICD-10-CM

## 2023-11-27 DIAGNOSIS — J06.9 URI WITH COUGH AND CONGESTION: Primary | ICD-10-CM

## 2023-11-27 PROCEDURE — 87880 STREP A ASSAY W/OPTIC: CPT

## 2023-11-27 PROCEDURE — 87428 SARSCOV & INF VIR A&B AG IA: CPT

## 2023-11-27 PROCEDURE — 99213 OFFICE O/P EST LOW 20 MIN: CPT

## 2023-11-27 RX ORDER — BROMPHENIRAMINE MALEATE, PSEUDOEPHEDRINE HYDROCHLORIDE, AND DEXTROMETHORPHAN HYDROBROMIDE 2; 30; 10 MG/5ML; MG/5ML; MG/5ML
10 SYRUP ORAL 4 TIMES DAILY PRN
Qty: 400 ML | Refills: 0 | Status: SHIPPED | OUTPATIENT
Start: 2023-11-27 | End: 2023-12-07

## 2023-11-27 NOTE — PROGRESS NOTES
2023     Vanessa Segal 20 y.o. male    : 2000   Chief Complaint:   Pharyngitis (For the past 2 days ) and Cough      History of Present Illness   Source of history provided by:  patient. Harpreet Bowers is a 20 y.o. old male who presents to walk-in for evaluation of sore throat x 2 days. Associated symptoms include cough and congestion. Since onset symptoms have been consistent. Patient has had known Covid 19 exposure. Patient has not been diagnosed with COVID-19 in the last 90 days. Has taken OTC medications at home with some symptomatic relief. Denies any fever, chills, CP, dyspnea, LE edema, abdominal pain, nausea, vomiting, rash, dizziness, or lethargy. Denies any history of asthma, pneumonia, recurrent bronchitis or COPD. They have no history of tobacco abuse. ROS   Past Medical History:   Past Medical History:   Diagnosis Date    Bilateral calf pain     Difficulty walking 2018    GERD (gastroesophageal reflux disease)     IBS (irritable bowel syndrome)     Numbness and tingling     BOTH CALFS    Pain in both lower legs 2018    Popliteal artery stenosis (HCC)     Prematurity     Weakness of hand     GRIPP WEAKNESS & ALSO SHAKE WHEN GRIPPING     Past Surgical History:  has a past surgical history that includes Circumcision; knee surgery; and vascular surgery. Social History:  reports that he has never smoked. He has never used smokeless tobacco. He reports that he does not drink alcohol and does not use drugs. Family History: family history includes Asthma in his mother; High Blood Pressure in his father. Allergies: Patient has no known allergies.     Unless otherwise stated in this report the patient's positive and negative responses for review of systems for constitutional, eyes, ENT, cardiovascular, respiratory, gastrointestinal, neurological, , musculoskeletal, and integument systems and related systems to the presenting problem are either stated in the

## 2023-12-12 DIAGNOSIS — F41.9 ANXIETY AND DEPRESSION: ICD-10-CM

## 2023-12-12 DIAGNOSIS — F32.A ANXIETY AND DEPRESSION: ICD-10-CM

## 2023-12-12 RX ORDER — CITALOPRAM HYDROBROMIDE 10 MG/1
10 TABLET ORAL DAILY
Qty: 30 TABLET | Refills: 0 | Status: SHIPPED
Start: 2023-12-12 | End: 2023-12-13 | Stop reason: SDUPTHER

## 2023-12-12 RX ORDER — BUSPIRONE HYDROCHLORIDE 5 MG/1
5 TABLET ORAL 2 TIMES DAILY
Qty: 60 TABLET | Refills: 0 | Status: SHIPPED
Start: 2023-12-12 | End: 2023-12-13 | Stop reason: SDUPTHER

## 2023-12-12 NOTE — TELEPHONE ENCOUNTER
PTS PARENT CALLED PT NEEDS REFILLS     citalopram (CELEXA) 10 MG tablet     busPIRone (BUSPAR) 5 MG tablet       [id number] - [place], [place] - [address] - P [phone] - F [phone] [[id number]]

## 2023-12-13 RX ORDER — BUSPIRONE HYDROCHLORIDE 5 MG/1
5 TABLET ORAL 2 TIMES DAILY
Qty: 60 TABLET | Refills: 3 | Status: SHIPPED | OUTPATIENT
Start: 2023-12-13

## 2023-12-13 RX ORDER — CITALOPRAM HYDROBROMIDE 10 MG/1
10 TABLET ORAL DAILY
Qty: 30 TABLET | Refills: 3 | Status: SHIPPED | OUTPATIENT
Start: 2023-12-13

## 2024-01-02 ENCOUNTER — TELEPHONE (OUTPATIENT)
Dept: PRIMARY CARE CLINIC | Age: 24
End: 2024-01-02

## 2024-01-02 NOTE — TELEPHONE ENCOUNTER
Pts father called asking if Dr Mack does I & D on abscess's     Small abscess on buttocks ( size is 1/2 of a ping pong ball)    Pt on antibiotic for a few days for abscess

## 2024-01-03 ENCOUNTER — OFFICE VISIT (OUTPATIENT)
Dept: PRIMARY CARE CLINIC | Age: 24
End: 2024-01-03
Payer: COMMERCIAL

## 2024-01-03 VITALS
BODY MASS INDEX: 31.64 KG/M2 | RESPIRATION RATE: 17 BRPM | OXYGEN SATURATION: 98 % | TEMPERATURE: 97.1 F | SYSTOLIC BLOOD PRESSURE: 102 MMHG | HEIGHT: 70 IN | DIASTOLIC BLOOD PRESSURE: 70 MMHG | WEIGHT: 221 LBS | HEART RATE: 101 BPM

## 2024-01-03 DIAGNOSIS — L02.91 ABSCESS: Primary | ICD-10-CM

## 2024-01-03 PROCEDURE — 99213 OFFICE O/P EST LOW 20 MIN: CPT | Performed by: STUDENT IN AN ORGANIZED HEALTH CARE EDUCATION/TRAINING PROGRAM

## 2024-01-03 RX ORDER — CEPHALEXIN 500 MG/1
500 CAPSULE ORAL 3 TIMES DAILY
Qty: 21 CAPSULE | Refills: 0 | Status: SHIPPED | OUTPATIENT
Start: 2024-01-03 | End: 2024-01-10

## 2024-01-03 ASSESSMENT — PATIENT HEALTH QUESTIONNAIRE - PHQ9
SUM OF ALL RESPONSES TO PHQ QUESTIONS 1-9: 5
SUM OF ALL RESPONSES TO PHQ QUESTIONS 1-9: 5
5. POOR APPETITE OR OVEREATING: 0
6. FEELING BAD ABOUT YOURSELF - OR THAT YOU ARE A FAILURE OR HAVE LET YOURSELF OR YOUR FAMILY DOWN: 1
3. TROUBLE FALLING OR STAYING ASLEEP: 1
SUM OF ALL RESPONSES TO PHQ QUESTIONS 1-9: 5
9. THOUGHTS THAT YOU WOULD BE BETTER OFF DEAD, OR OF HURTING YOURSELF: 0
7. TROUBLE CONCENTRATING ON THINGS, SUCH AS READING THE NEWSPAPER OR WATCHING TELEVISION: 1
1. LITTLE INTEREST OR PLEASURE IN DOING THINGS: 0
SUM OF ALL RESPONSES TO PHQ QUESTIONS 1-9: 5
10. IF YOU CHECKED OFF ANY PROBLEMS, HOW DIFFICULT HAVE THESE PROBLEMS MADE IT FOR YOU TO DO YOUR WORK, TAKE CARE OF THINGS AT HOME, OR GET ALONG WITH OTHER PEOPLE: 0
2. FEELING DOWN, DEPRESSED OR HOPELESS: 1
8. MOVING OR SPEAKING SO SLOWLY THAT OTHER PEOPLE COULD HAVE NOTICED. OR THE OPPOSITE, BEING SO FIGETY OR RESTLESS THAT YOU HAVE BEEN MOVING AROUND A LOT MORE THAN USUAL: 0
SUM OF ALL RESPONSES TO PHQ9 QUESTIONS 1 & 2: 1
4. FEELING TIRED OR HAVING LITTLE ENERGY: 1

## 2024-01-03 NOTE — PROGRESS NOTES
keflex, call office if no improvement or worsening   - cephALEXin (KEFLEX) 500 MG capsule; Take 1 capsule by mouth 3 times daily for 7 days  Dispense: 21 capsule; Refill: 0      Counseled regarding above diagnosis, including possible risks and complications,  especially if left uncontrolled. Counseled regarding the possible side effects, risks, benefits and alternatives to treatment;patient and/or guardian verbalizes understanding, agrees, feels comfortable with and wishes to proceed with above treatment plan.    Call or go to EDmediately if symptoms worsen or persist. Advised patient to call with any new medication issues, and, as applicable, read all Rx info from pharmacy to assure aware of all possible risks and side effects of medicationbefore taking.     Patient and/or guardian given opportunity to ask questions/raise concerns.  The patient verbalized comfort and understanding ofinstructions.    I encourage further reading and education about your health conditions.  Information on many health conditions is provided by the[facility]: [email or web address]  Please bring any questions to me at your nextvisit.    Return to Office: Return in about 1 week (around 1/10/2024) for f/u abscess .    Medication List:    Current Outpatient Medications   Medication Sig Dispense Refill    cephALEXin (KEFLEX) 500 MG capsule Take 1 capsule by mouth 3 times daily for 7 days 21 capsule 0    busPIRone (BUSPAR) 5 MG tablet Take 1 tablet by mouth 2 times daily 60 tablet 3    citalopram (CELEXA) 10 MG tablet Take 1 tablet by mouth daily 30 tablet 3    pantoprazole (PROTONIX) 40 MG tablet Take 1 tablet by mouth daily (with breakfast) 30 tablet 3    ondansetron (ZOFRAN) 4 MG tablet Take 1 tablet by mouth 3 times daily as needed for Nausea or Vomiting 30 tablet 0    ondansetron (ZOFRAN-ODT) 4 MG disintegrating tablet Place 1 tablet under the tongue 3 times daily as needed for Nausea or Vomiting (Patient

## 2024-01-12 ENCOUNTER — OFFICE VISIT (OUTPATIENT)
Dept: PRIMARY CARE CLINIC | Age: 24
End: 2024-01-12
Payer: COMMERCIAL

## 2024-01-12 VITALS
SYSTOLIC BLOOD PRESSURE: 102 MMHG | DIASTOLIC BLOOD PRESSURE: 70 MMHG | BODY MASS INDEX: 30.82 KG/M2 | TEMPERATURE: 96.4 F | HEIGHT: 71 IN | HEART RATE: 91 BPM | RESPIRATION RATE: 17 BRPM | OXYGEN SATURATION: 97 %

## 2024-01-12 DIAGNOSIS — Z23 NEEDS FLU SHOT: ICD-10-CM

## 2024-01-12 DIAGNOSIS — L02.91 ABSCESS: ICD-10-CM

## 2024-01-12 DIAGNOSIS — F41.9 ANXIETY AND DEPRESSION: Primary | ICD-10-CM

## 2024-01-12 DIAGNOSIS — F32.A ANXIETY AND DEPRESSION: Primary | ICD-10-CM

## 2024-01-12 PROCEDURE — 90674 CCIIV4 VAC NO PRSV 0.5 ML IM: CPT | Performed by: STUDENT IN AN ORGANIZED HEALTH CARE EDUCATION/TRAINING PROGRAM

## 2024-01-12 PROCEDURE — 99213 OFFICE O/P EST LOW 20 MIN: CPT | Performed by: STUDENT IN AN ORGANIZED HEALTH CARE EDUCATION/TRAINING PROGRAM

## 2024-01-12 PROCEDURE — 90471 IMMUNIZATION ADMIN: CPT | Performed by: STUDENT IN AN ORGANIZED HEALTH CARE EDUCATION/TRAINING PROGRAM

## 2024-01-12 RX ORDER — BUSPIRONE HYDROCHLORIDE 5 MG/1
5 TABLET ORAL 2 TIMES DAILY
Qty: 180 TABLET | Refills: 5 | Status: SHIPPED
Start: 2024-01-12 | End: 2024-01-12

## 2024-01-12 RX ORDER — BUSPIRONE HYDROCHLORIDE 5 MG/1
5 TABLET ORAL 2 TIMES DAILY
Qty: 180 TABLET | Refills: 5 | Status: SHIPPED | OUTPATIENT
Start: 2024-01-12 | End: 2024-04-11

## 2024-01-12 RX ORDER — CITALOPRAM HYDROBROMIDE 10 MG/1
10 TABLET ORAL DAILY
Qty: 90 TABLET | Refills: 5 | Status: SHIPPED
Start: 2024-01-12 | End: 2024-01-12

## 2024-01-12 RX ORDER — CITALOPRAM HYDROBROMIDE 10 MG/1
10 TABLET ORAL DAILY
Qty: 90 TABLET | Refills: 5 | Status: SHIPPED | OUTPATIENT
Start: 2024-01-12

## 2024-01-12 NOTE — PROGRESS NOTES
[facility]  Department of Family Medicine      Patient:  Jesus Segal 23 y.o. male     Date of Service: 1/12/24      Chief complaint:   Chief Complaint   Patient presents with    Abscess         History ofPresent Illness   The patient is a 23 y.o. male  presented to the clinic with complaints as above.    Abscess  -f/u  -on buttock, left side  -was draining thus was started on keflex and I and D was not done  -currently,   -started 3 days ago   -seems to be getting bigger in size  -is sensitive  -having some temperature with it  -moving bowels and urinating normally  -there is some discharge coming out of it   -is on bactrim, which has not helped     Past Medical History:      Diagnosis Date    Bilateral calf pain     Difficulty walking 2/28/2018    GERD (gastroesophageal reflux disease)     IBS (irritable bowel syndrome)     Numbness and tingling     BOTH CALFS    Pain in both lower legs 2/28/2018    Popliteal artery stenosis (HCC)     Prematurity     Weakness of hand     GRIPP WEAKNESS & ALSO SHAKE WHEN GRIPPING       PastSurgical History:        Procedure Laterality Date    CIRCUMCISION      KNEE SURGERY      VASCULAR SURGERY         Allergies:    Patient has no known allergies.    Social History:   Social History     Socioeconomic History    Marital status: Single     Spouse name: Not on file    Number of children: Not on file    Years of education: Not on file    Highest education level: Not on file   Occupational History    Not on file   Tobacco Use    Smoking status: Never    Smokeless tobacco: Never   Substance and Sexual Activity    Alcohol use: No    Drug use: No    Sexual activity: Not on file   Other Topics Concern    Not on file   Social History Narrative    Not on file     Social Determinants of Health     Financial Resource Strain: Low Risk  (5/5/2023)    Overall Financial Resource Strain (CARDIA)     Difficulty of Paying Living Expenses: Not hard at all   Food Insecurity:

## 2024-02-05 ENCOUNTER — OFFICE VISIT (OUTPATIENT)
Dept: PRIMARY CARE CLINIC | Age: 24
End: 2024-02-05
Payer: COMMERCIAL

## 2024-02-05 VITALS
TEMPERATURE: 97 F | DIASTOLIC BLOOD PRESSURE: 83 MMHG | RESPIRATION RATE: 20 BRPM | WEIGHT: 229 LBS | OXYGEN SATURATION: 97 % | HEART RATE: 116 BPM | SYSTOLIC BLOOD PRESSURE: 129 MMHG | BODY MASS INDEX: 32.06 KG/M2 | HEIGHT: 71 IN

## 2024-02-05 DIAGNOSIS — J10.1 INFLUENZA A: ICD-10-CM

## 2024-02-05 DIAGNOSIS — J02.9 SORE THROAT: ICD-10-CM

## 2024-02-05 DIAGNOSIS — R05.9 COUGH IN ADULT PATIENT: ICD-10-CM

## 2024-02-05 LAB
INFLUENZA A ANTIGEN, POC: POSITIVE
INFLUENZA B ANTIGEN, POC: NEGATIVE
LOT EXPIRE DATE: NORMAL
LOT KIT NUMBER: NORMAL
S PYO AG THROAT QL: NORMAL
SARS-COV-2, POC: NORMAL
VALID INTERNAL CONTROL: NORMAL
VENDOR AND KIT NAME POC: NORMAL

## 2024-02-05 PROCEDURE — 87880 STREP A ASSAY W/OPTIC: CPT | Performed by: NURSE PRACTITIONER

## 2024-02-05 PROCEDURE — 99213 OFFICE O/P EST LOW 20 MIN: CPT | Performed by: NURSE PRACTITIONER

## 2024-02-05 PROCEDURE — 87428 SARSCOV & INF VIR A&B AG IA: CPT | Performed by: NURSE PRACTITIONER

## 2024-02-05 RX ORDER — DEXTROMETHORPHAN HYDROBROMIDE AND PROMETHAZINE HYDROCHLORIDE 15; 6.25 MG/5ML; MG/5ML
5 SYRUP ORAL 4 TIMES DAILY PRN
Qty: 180 ML | Refills: 0 | Status: SHIPPED | OUTPATIENT
Start: 2024-02-05 | End: 2024-02-12

## 2024-02-05 RX ORDER — OSELTAMIVIR PHOSPHATE 75 MG/1
75 CAPSULE ORAL 2 TIMES DAILY
Qty: 10 CAPSULE | Refills: 0 | Status: SHIPPED | OUTPATIENT
Start: 2024-02-05 | End: 2024-02-10

## 2024-02-05 NOTE — PROGRESS NOTES
Chief Complaint:   Pharyngitis (Since Wednesday ), Fever, and Cough (Since Saturday )      History of Present Illness   Source of history provided by:  patient.      Jesus Segal is a 23 y.o. old male with a past medical history of:   Past Medical History:   Diagnosis Date    Bilateral calf pain     Difficulty walking 2/28/2018    GERD (gastroesophageal reflux disease)     IBS (irritable bowel syndrome)     Numbness and tingling     BOTH CALFS    Pain in both lower legs 2/28/2018    Popliteal artery stenosis (HCC)     Prematurity     Weakness of hand     GRIPP WEAKNESS & ALSO SHAKE WHEN GRIPPING        Pt presents to the [facility] Care with a cough/sore throat/fevers/congestion for the past few days.  States the cough is non productive.    Subjective fever noted.    Denies any N/V/D, abdominal pain, CP, progressive SOB, dizziness, or lethargy.       ROS    Unless otherwise stated in this report or unable to obtain because of the patient's clinical or mental status as evidenced by the medical record, this patients's positive and negative responses for Review of Systems, constitutional, psych, eyes, ENT, cardiovascular, respiratory, gastrointestinal, neurological, genitourinary, musculoskeletal, integument systems and systems related to the presenting problem are either stated in the preceding or were not pertinent or were negative for the symptoms and/or complaints related to the medical problem.    Past Surgical History:  has a past surgical history that includes Circumcision; knee surgery; and vascular surgery.  Social History:  reports that he has never smoked. He has never used smokeless tobacco. He reports that he does not drink alcohol and does not use drugs.  Family History: family history includes Asthma in his mother; High Blood Pressure in his father.   Allergies: Patient has no known allergies.    Physical Exam         VS:  /83   Pulse (!) 116   Temp 97 °F (36.1 °C)   Resp 20   Ht 1.803 m (5' 11\")

## 2024-02-06 ENCOUNTER — TELEPHONE (OUTPATIENT)
Dept: PRIMARY CARE CLINIC | Age: 24
End: 2024-02-06

## 2024-02-06 NOTE — TELEPHONE ENCOUNTER
Mom called asking if  would write a note that patient can return back to school on Monday 2/12/2024    Pt went to [facility] walk in clinic  Tested positive for the flu , pt has a bad cough      Walk in clinic wrote note can return back to school on Wednesday2/7/2024 , parent thinks pt should be out longer due to severity

## 2024-03-04 ENCOUNTER — OFFICE VISIT (OUTPATIENT)
Dept: PRIMARY CARE CLINIC | Age: 24
End: 2024-03-04
Payer: COMMERCIAL

## 2024-03-04 VITALS
HEART RATE: 87 BPM | DIASTOLIC BLOOD PRESSURE: 71 MMHG | WEIGHT: 231 LBS | TEMPERATURE: 97.8 F | HEIGHT: 71 IN | SYSTOLIC BLOOD PRESSURE: 116 MMHG | BODY MASS INDEX: 32.34 KG/M2 | OXYGEN SATURATION: 93 % | RESPIRATION RATE: 19 BRPM

## 2024-03-04 DIAGNOSIS — M54.9 UPPER BACK PAIN ON LEFT SIDE: ICD-10-CM

## 2024-03-04 DIAGNOSIS — R07.81 RIB PAIN ON LEFT SIDE: ICD-10-CM

## 2024-03-04 DIAGNOSIS — R07.1 PAINFUL BREATHING: Primary | ICD-10-CM

## 2024-03-04 PROCEDURE — 99213 OFFICE O/P EST LOW 20 MIN: CPT | Performed by: NURSE PRACTITIONER

## 2024-03-04 NOTE — PROGRESS NOTES
Chief Complaint:   Back Pain (Since last night and rib pain )      History of Present Illness   Source of history provided by:  patient/mother      Jesus Segal is a 23 y.o. old male with a past medical history of:   Past Medical History:   Diagnosis Date    Bilateral calf pain     Difficulty walking 2/28/2018    GERD (gastroesophageal reflux disease)     IBS (irritable bowel syndrome)     Numbness and tingling     BOTH CALFS    Pain in both lower legs 2/28/2018    Popliteal artery stenosis (HCC)     Prematurity     Weakness of hand     GRIPP WEAKNESS & ALSO SHAKE WHEN GRIPPING       Pt  presents to the [facility] Emergency Department with left upper back and left sided rib pain that started last evening. Denies any recent injuries to affected areas. Pt stated pain increases w/deep breathing. Denies cough or recent illness  No fever noted.    Denies any N/V/D, congestion, rashes, sore throat, abdominal pain, CP, progressive SOB, dizziness, or lethargy. Pt has been seen multiple times at [facility] and have provided pt with multiple excuses w/prolonged time off from College.         ROS    Unless otherwise stated in this report or unable to obtain because of the patient's clinical or mental status as evidenced by the medical record, this patients's positive and negative responses for Review of Systems, constitutional, psych, eyes, ENT, cardiovascular, respiratory, gastrointestinal, neurological, genitourinary, musculoskeletal, integument systems and systems related to the presenting problem are either stated in the preceding or were not pertinent or were negative for the symptoms and/or complaints related to the medical problem.    Past Surgical History:  has a past surgical history that includes Circumcision; knee surgery; and vascular surgery.  Social History:  reports that he has never smoked. He has never used smokeless tobacco. He reports that he does not drink alcohol and does not use drugs.  Family History: family history

## 2024-05-10 ENCOUNTER — OFFICE VISIT (OUTPATIENT)
Dept: PRIMARY CARE CLINIC | Age: 24
End: 2024-05-10

## 2024-05-10 VITALS
WEIGHT: 234 LBS | BODY MASS INDEX: 32.76 KG/M2 | HEART RATE: 92 BPM | TEMPERATURE: 97.5 F | SYSTOLIC BLOOD PRESSURE: 98 MMHG | OXYGEN SATURATION: 96 % | HEIGHT: 71 IN | DIASTOLIC BLOOD PRESSURE: 70 MMHG | RESPIRATION RATE: 17 BRPM

## 2024-05-10 DIAGNOSIS — F41.9 ANXIETY AND DEPRESSION: ICD-10-CM

## 2024-05-10 DIAGNOSIS — Z00.00 ANNUAL PHYSICAL EXAM: Primary | ICD-10-CM

## 2024-05-10 DIAGNOSIS — F32.A ANXIETY AND DEPRESSION: ICD-10-CM

## 2024-05-10 PROCEDURE — 99395 PREV VISIT EST AGE 18-39: CPT | Performed by: STUDENT IN AN ORGANIZED HEALTH CARE EDUCATION/TRAINING PROGRAM

## 2024-05-10 RX ORDER — BUSPIRONE HYDROCHLORIDE 10 MG/1
10 TABLET ORAL 2 TIMES DAILY PRN
Qty: 30 TABLET | Refills: 0 | Status: SHIPPED | OUTPATIENT
Start: 2024-05-10

## 2024-05-10 SDOH — ECONOMIC STABILITY: FOOD INSECURITY: WITHIN THE PAST 12 MONTHS, THE FOOD YOU BOUGHT JUST DIDN'T LAST AND YOU DIDN'T HAVE MONEY TO GET MORE.: NEVER TRUE

## 2024-05-10 SDOH — ECONOMIC STABILITY: FOOD INSECURITY: WITHIN THE PAST 12 MONTHS, YOU WORRIED THAT YOUR FOOD WOULD RUN OUT BEFORE YOU GOT MONEY TO BUY MORE.: NEVER TRUE

## 2024-05-10 SDOH — ECONOMIC STABILITY: INCOME INSECURITY: HOW HARD IS IT FOR YOU TO PAY FOR THE VERY BASICS LIKE FOOD, HOUSING, MEDICAL CARE, AND HEATING?: NOT HARD AT ALL

## 2024-05-10 NOTE — PROGRESS NOTES
[facility]  Department of Family Medicine      Patient:  Jesus Seagl 24 y.o. male     Date of Service: 5/10/24      Chief complaint:   Chief Complaint   Patient presents with    Annual Exam         History ofPresent Illness   The patient is a 24 y.o. male  presented to the clinic with complaints as above.    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     Mood  -f/u  -mood has been fine   -doesn't know if he feels much difference on the celexa, feels it helps a little however still feels anxious at times  -denies any HI or SI  -only takes buspar when needed and he feels anxious which does help calm him somewhat     Moving bowels and urinating normally   Denies any chest pain or SOB       Past Medical History:      Diagnosis Date    Bilateral calf pain     Difficulty walking 2/28/2018    GERD (gastroesophageal reflux disease)     IBS (irritable bowel syndrome)     Numbness and tingling     BOTH CALFS    Pain in both lower legs 2/28/2018    Popliteal artery stenosis (HCC)     Prematurity     Weakness of hand     GRIPP WEAKNESS & ALSO SHAKE WHEN GRIPPING       PastSurgical History:        Procedure Laterality Date    CIRCUMCISION      KNEE SURGERY      VASCULAR SURGERY         Allergies:    Patient has no known allergies.    Social History:   Social History     Socioeconomic History    Marital status: Single     Spouse name: Not on file    Number of children: Not on file    Years of education: Not on file    Highest education level: Not on file   Occupational History    Not on file   Tobacco Use    Smoking status: Never    Smokeless tobacco: Never   Substance and Sexual Activity    Alcohol use: No    Drug use: No    Sexual activity: Not on file   Other Topics Concern    Not on file   Social History Narrative    Not on file     Social Determinants of Health     Financial Resource Strain: Low Risk  (5/10/2024)    Overall Financial Resource

## 2024-10-28 ENCOUNTER — OFFICE VISIT (OUTPATIENT)
Dept: FAMILY MEDICINE CLINIC | Age: 24
End: 2024-10-28

## 2024-10-28 VITALS
DIASTOLIC BLOOD PRESSURE: 78 MMHG | HEIGHT: 71 IN | BODY MASS INDEX: 32.51 KG/M2 | WEIGHT: 232.2 LBS | SYSTOLIC BLOOD PRESSURE: 120 MMHG | HEART RATE: 87 BPM | TEMPERATURE: 98.2 F | OXYGEN SATURATION: 98 %

## 2024-10-28 DIAGNOSIS — J06.9 URI WITH COUGH AND CONGESTION: Primary | ICD-10-CM

## 2024-10-28 DIAGNOSIS — J02.9 SORE THROAT: ICD-10-CM

## 2024-10-28 DIAGNOSIS — R68.89 FLU-LIKE SYMPTOMS: ICD-10-CM

## 2024-10-28 DIAGNOSIS — H61.23 CERUMEN DEBRIS ON TYMPANIC MEMBRANE, BILATERAL: ICD-10-CM

## 2024-10-28 LAB
INFLUENZA A ANTIBODY: NORMAL
INFLUENZA B ANTIBODY: NORMAL
Lab: NORMAL
PERFORMING INSTRUMENT: NORMAL
QC PASS/FAIL: NORMAL
S PYO AG THROAT QL: NORMAL
SARS-COV-2, POC: NORMAL

## 2024-10-28 RX ORDER — CEFDINIR 300 MG/1
300 CAPSULE ORAL EVERY 12 HOURS
Qty: 20 CAPSULE | Refills: 0 | Status: SHIPPED | OUTPATIENT
Start: 2024-10-28 | End: 2024-11-07

## 2024-10-28 RX ORDER — METHYLPREDNISOLONE 4 MG/1
TABLET ORAL
Qty: 1 KIT | Refills: 0 | Status: SHIPPED | OUTPATIENT
Start: 2024-10-28

## 2024-10-28 RX ORDER — ASPIRIN 81 MG
5 TABLET, DELAYED RELEASE (ENTERIC COATED) ORAL PRN
Qty: 15 ML | Refills: 0 | Status: SHIPPED | OUTPATIENT
Start: 2024-10-28

## 2024-10-28 NOTE — PROGRESS NOTES
Increase fluids and rest. Additional symptomatic relief discussed.    Script written for Medrol Dosepak and cefdinir, side effects discussed.  Agreeable to provide antibiotics if not improving in a few days, they are agreeable to start if no improvement.  Additionally, I sent over Debrox due to cerumen debris's in bilateral tympanic membranes.    Follow up PCP no improvement after 7-10 days of symptoms. ED sooner if symptoms worsen or change. Red flag symptoms discussed. Pt is in agreement with this care plan. All questions answered.    ES Duran    **This report was transcribed using voice recognition software. The patient (or guardian, if applicable) and other individuals in attendance with the patient were advised that if Artificial Intelligence would be utilized during this visit to record and process the conversation to generate a clinical note they would be informed prior to start of the visit. The patient (or guardian, if applicable) and other individuals in attendance at the appointment consented to the use of AI if was utilized, including the recording.  Every effort was made to ensure accuracy; however, inadvertent computerized transcription errors may be present.

## 2024-11-14 NOTE — PROGRESS NOTES
issue  -Doing well off meds, will just watch for now, get lab work per request  - Influenza, FLUCELVAX Trivalent, (age 6 mo+) IM, Preservative Free, 0.5mL  - CBC  - Comprehensive Metabolic Panel  - Lipid Panel  - TSH    2. Needs flu shot  HCM  - Influenza, FLUCELVAX Trivalent, (age 6 mo+) IM, Preservative Free, 0.5mL    3. Encounter for lipid screening for cardiovascular disease  HCM:  - Lipid Panel        I am the primary care provider for this patient and provide the patient with continuity of care.     Counseled regarding above diagnosis, including possible risks and complications,  especially if left uncontrolled. Counseled regarding the possible side effects, risks, benefits and alternatives to treatment;patient and/or guardian verbalizes understanding, agrees, feels comfortable with and wishes to proceed with above treatment plan.    Call or go to EDimmediately if symptoms worsen or persist. Advised patient to call with any new medication issues, and, as applicable, read all Rx info from pharmacy to assure aware of all possible risks and side effects of medicationbefore taking.     Patient and/or guardian given opportunity to ask questions/raise concerns.  The patient verbalized comfort and understanding ofinstructions.    I encourage further reading and education about your health conditions.  Information on many health conditions is provided by the[facility] [employer]: [email or web address]/  Please bring any questions to me at your nextvisit.    Return to Office: Return in about 6 months (around 5/15/2025) for f/u anxiety .    Medication List:    Current Outpatient Medications   Medication Sig Dispense Refill    methylPREDNISolone (MEDROL DOSEPACK) 4 MG tablet Take by mouth. 1 kit 0    carbamide peroxide (DEBROX) 6.5 % otic solution Place 5 drops into both ears as needed (wax buildup) 15 mL 0    busPIRone (BUSPAR) 10 MG tablet Take 1 tablet by mouth 2 times daily as needed (anxiety) 30

## 2024-11-15 ENCOUNTER — OFFICE VISIT (OUTPATIENT)
Dept: PRIMARY CARE CLINIC | Age: 24
End: 2024-11-15

## 2024-11-15 VITALS
HEART RATE: 90 BPM | WEIGHT: 228 LBS | TEMPERATURE: 97.3 F | RESPIRATION RATE: 18 BRPM | OXYGEN SATURATION: 97 % | BODY MASS INDEX: 31.8 KG/M2 | DIASTOLIC BLOOD PRESSURE: 80 MMHG | SYSTOLIC BLOOD PRESSURE: 118 MMHG

## 2024-11-15 DIAGNOSIS — F32.A ANXIETY AND DEPRESSION: Primary | ICD-10-CM

## 2024-11-15 DIAGNOSIS — Z13.6 ENCOUNTER FOR LIPID SCREENING FOR CARDIOVASCULAR DISEASE: ICD-10-CM

## 2024-11-15 DIAGNOSIS — F41.9 ANXIETY AND DEPRESSION: Primary | ICD-10-CM

## 2024-11-15 DIAGNOSIS — Z13.220 ENCOUNTER FOR LIPID SCREENING FOR CARDIOVASCULAR DISEASE: ICD-10-CM

## 2024-11-15 DIAGNOSIS — Z23 NEEDS FLU SHOT: ICD-10-CM

## 2024-11-15 LAB
ALBUMIN: 4.3 G/DL (ref 3.5–5.2)
ALP BLD-CCNC: 103 U/L (ref 40–129)
ALT SERPL-CCNC: 42 U/L (ref 0–40)
ANION GAP SERPL CALCULATED.3IONS-SCNC: 19 MMOL/L (ref 7–16)
AST SERPL-CCNC: 30 U/L (ref 0–39)
BILIRUB SERPL-MCNC: 0.6 MG/DL (ref 0–1.2)
BUN BLDV-MCNC: 16 MG/DL (ref 6–20)
CALCIUM SERPL-MCNC: 9.3 MG/DL (ref 8.6–10.2)
CHLORIDE BLD-SCNC: 100 MMOL/L (ref 98–107)
CHOLESTEROL, TOTAL: 196 MG/DL
CO2: 24 MMOL/L (ref 22–29)
CREAT SERPL-MCNC: 1.1 MG/DL (ref 0.7–1.2)
GFR, ESTIMATED: >90 ML/MIN/1.73M2
GLUCOSE BLD-MCNC: 97 MG/DL (ref 74–99)
HCT VFR BLD CALC: 46.7 % (ref 37–54)
HDLC SERPL-MCNC: 30 MG/DL
HEMOGLOBIN: 14.4 G/DL (ref 12.5–16.5)
LDL CHOLESTEROL: 143 MG/DL
MCH RBC QN AUTO: 27.3 PG (ref 26–35)
MCHC RBC AUTO-ENTMCNC: 30.8 G/DL (ref 32–34.5)
MCV RBC AUTO: 88.6 FL (ref 80–99.9)
PDW BLD-RTO: 13.2 % (ref 11.5–15)
PLATELET # BLD: 234 K/UL (ref 130–450)
PMV BLD AUTO: 12.2 FL (ref 7–12)
POTASSIUM SERPL-SCNC: 3.9 MMOL/L (ref 3.5–5)
RBC # BLD: 5.27 M/UL (ref 3.8–5.8)
SODIUM BLD-SCNC: 143 MMOL/L (ref 132–146)
TOTAL PROTEIN: 7.3 G/DL (ref 6.4–8.3)
TRIGL SERPL-MCNC: 114 MG/DL
TSH SERPL DL<=0.05 MIU/L-ACNC: 2.46 UIU/ML (ref 0.27–4.2)
VLDLC SERPL CALC-MCNC: 23 MG/DL
WBC # BLD: 7.2 K/UL (ref 4.5–11.5)

## 2025-02-27 ENCOUNTER — TELEMEDICINE (OUTPATIENT)
Dept: PRIMARY CARE CLINIC | Age: 25
End: 2025-02-27

## 2025-02-27 DIAGNOSIS — J02.0 STREP PHARYNGITIS: Primary | ICD-10-CM

## 2025-02-27 PROCEDURE — 99213 OFFICE O/P EST LOW 20 MIN: CPT | Performed by: STUDENT IN AN ORGANIZED HEALTH CARE EDUCATION/TRAINING PROGRAM

## 2025-02-27 RX ORDER — AMOXICILLIN 500 MG/1
500 CAPSULE ORAL 2 TIMES DAILY
Qty: 20 CAPSULE | Refills: 0 | Status: SHIPPED | OUTPATIENT
Start: 2025-02-27 | End: 2025-03-09

## 2025-02-27 SDOH — ECONOMIC STABILITY: FOOD INSECURITY: WITHIN THE PAST 12 MONTHS, THE FOOD YOU BOUGHT JUST DIDN'T LAST AND YOU DIDN'T HAVE MONEY TO GET MORE.: NEVER TRUE

## 2025-02-27 SDOH — ECONOMIC STABILITY: FOOD INSECURITY: WITHIN THE PAST 12 MONTHS, YOU WORRIED THAT YOUR FOOD WOULD RUN OUT BEFORE YOU GOT MONEY TO BUY MORE.: NEVER TRUE

## 2025-02-27 ASSESSMENT — PATIENT HEALTH QUESTIONNAIRE - PHQ9
8. MOVING OR SPEAKING SO SLOWLY THAT OTHER PEOPLE COULD HAVE NOTICED. OR THE OPPOSITE, BEING SO FIGETY OR RESTLESS THAT YOU HAVE BEEN MOVING AROUND A LOT MORE THAN USUAL: NOT AT ALL
SUM OF ALL RESPONSES TO PHQ QUESTIONS 1-9: 0
SUM OF ALL RESPONSES TO PHQ QUESTIONS 1-9: 0
10. IF YOU CHECKED OFF ANY PROBLEMS, HOW DIFFICULT HAVE THESE PROBLEMS MADE IT FOR YOU TO DO YOUR WORK, TAKE CARE OF THINGS AT HOME, OR GET ALONG WITH OTHER PEOPLE: NOT DIFFICULT AT ALL
2. FEELING DOWN, DEPRESSED OR HOPELESS: NOT AT ALL
1. LITTLE INTEREST OR PLEASURE IN DOING THINGS: NOT AT ALL
7. TROUBLE CONCENTRATING ON THINGS, SUCH AS READING THE NEWSPAPER OR WATCHING TELEVISION: NOT AT ALL
SUM OF ALL RESPONSES TO PHQ9 QUESTIONS 1 & 2: 0
9. THOUGHTS THAT YOU WOULD BE BETTER OFF DEAD, OR OF HURTING YOURSELF: NOT AT ALL
4. FEELING TIRED OR HAVING LITTLE ENERGY: NOT AT ALL
6. FEELING BAD ABOUT YOURSELF - OR THAT YOU ARE A FAILURE OR HAVE LET YOURSELF OR YOUR FAMILY DOWN: NOT AT ALL
SUM OF ALL RESPONSES TO PHQ QUESTIONS 1-9: 0
SUM OF ALL RESPONSES TO PHQ QUESTIONS 1-9: 0
5. POOR APPETITE OR OVEREATING: NOT AT ALL
3. TROUBLE FALLING OR STAYING ASLEEP: NOT AT ALL

## 2025-02-27 NOTE — PROGRESS NOTES
Jesus Segal, was evaluated through a synchronous (real-time) audio-video encounter. The patient (or guardian if applicable) is aware that this is a billable service, which includes applicable co-pays. This Virtual Visit was conducted with patient's (and/or legal guardian's) consent. Patient identification was verified, and a caregiver was present when appropriate.   The patient was located at Other: school  Provider was located at Facility ([facility] [place]): [address] [place] [address]  Confirm you are appropriately licensed, registered, or certified to deliver care in the state where the patient is located as indicated above. If you are not or unsure, please re-schedule the visit: Yes, I confirm.     Jesus Segal (:  2000) is a Established patient, presenting virtually for evaluation of the following:      Below is the assessment and plan developed based on review of pertinent history, physical exam, labs, studies, and medications.     Assessment & Plan  Strep pharyngitis   New issue  -Symptoms highly suggestive of this, will start amoxil, call if no improvement or worsening, go back to school this upcoming Monday     Orders:    amoxicillin (AMOXIL) 500 MG capsule; Take 1 capsule by mouth 2 times daily for 10 days      Return if symptoms worsen or fail to improve.       Subjective   HPI    Congestion and sore throat  -new issue  -started Saturday as sore throat   -denies any sob  -some fever and chills and fatigue   -having nausea and has vomited a few times  -tmax of 102    Review of Systems - as above        Objective   Patient-Reported Vitals  No data recorded     Physical Exam  [INSTRUCTIONS:  \"[x]\" Indicates a positive item  \"[]\" Indicates a negative item  -- DELETE ALL ITEMS NOT EXAMINED]    Constitutional: [x] Appears well-developed and well-nourished [x] No apparent distress      [] Abnormal -     Mental status: [x] Alert and awake  [x] Oriented to person/place/time [x] Able to

## 2025-04-03 ENCOUNTER — TELEMEDICINE (OUTPATIENT)
Dept: PRIMARY CARE CLINIC | Age: 25
End: 2025-04-03

## 2025-04-03 DIAGNOSIS — J06.9 VIRAL URI: Primary | ICD-10-CM

## 2025-04-03 PROCEDURE — 99213 OFFICE O/P EST LOW 20 MIN: CPT | Performed by: STUDENT IN AN ORGANIZED HEALTH CARE EDUCATION/TRAINING PROGRAM

## 2025-04-03 RX ORDER — FAMOTIDINE 20 MG/1
20 TABLET, FILM COATED ORAL 2 TIMES DAILY
Qty: 60 TABLET | Refills: 3 | Status: SHIPPED | OUTPATIENT
Start: 2025-04-03

## 2025-04-03 RX ORDER — LORATADINE 10 MG/1
10 TABLET ORAL DAILY
Qty: 30 TABLET | Refills: 0 | Status: SHIPPED | OUTPATIENT
Start: 2025-04-03

## 2025-04-03 NOTE — PROGRESS NOTES
Jesus Segal, was evaluated through a synchronous (real-time) audio-video encounter. The patient (or guardian if applicable) is aware that this is a billable service, which includes applicable co-pays. This Virtual Visit was conducted with patient's (and/or legal guardian's) consent. Patient identification was verified, and a caregiver was present when appropriate.   The patient was located at Other:    Provider was located at Facility ([facility]): [address] [place] [address]  Confirm you are appropriately licensed, registered, or certified to deliver care in the state where the patient is located as indicated above. If you are not or unsure, please re-schedule the visit: Yes, I confirm.     Jesus Segal (:  2000) is a Established patient, presenting virtually for evaluation of the following:      Below is the assessment and plan developed based on review of pertinent history, physical exam, labs, studies, and medications.     Assessment & Plan  Viral URI  New issue   -Given symptoms and duration, could be viral vs allergic in nature, will trial supportive therapy as below  -Advised patient to call if no improvement in symptoms    Orders:    loratadine (CLARITIN) 10 MG tablet; Take 1 tablet by mouth daily    famotidine (PEPCID) 20 MG tablet; Take 1 tablet by mouth 2 times daily      Return if symptoms worsen or fail to improve.       Subjective   HPI    Nausea   -new issue  -started as sore throat, nausea, and headache, previously treated for strep and felt better afterwards  -symptoms started Monday  -taking ibuprofen and tylenol which help a little bit   -does not feel congested  -denies any fever, chills, or SOB   -is coughing somewhat   -was having diarrhea but having constipation now     Review of Systems - as above        Objective   Patient-Reported Vitals  No data recorded     Physical Exam  [INSTRUCTIONS:  \"[x]\" Indicates a positive item  \"[]\" Indicates a negative item  --

## 2025-05-16 ENCOUNTER — OFFICE VISIT (OUTPATIENT)
Dept: PRIMARY CARE CLINIC | Age: 25
End: 2025-05-16

## 2025-05-16 VITALS
SYSTOLIC BLOOD PRESSURE: 126 MMHG | OXYGEN SATURATION: 98 % | RESPIRATION RATE: 16 BRPM | WEIGHT: 228 LBS | HEART RATE: 74 BPM | DIASTOLIC BLOOD PRESSURE: 74 MMHG | BODY MASS INDEX: 31.8 KG/M2

## 2025-05-16 DIAGNOSIS — F41.9 ANXIETY AND DEPRESSION: Primary | ICD-10-CM

## 2025-05-16 DIAGNOSIS — F32.A ANXIETY AND DEPRESSION: Primary | ICD-10-CM

## 2025-05-16 PROCEDURE — G2211 COMPLEX E/M VISIT ADD ON: HCPCS | Performed by: STUDENT IN AN ORGANIZED HEALTH CARE EDUCATION/TRAINING PROGRAM

## 2025-05-16 PROCEDURE — 99213 OFFICE O/P EST LOW 20 MIN: CPT | Performed by: STUDENT IN AN ORGANIZED HEALTH CARE EDUCATION/TRAINING PROGRAM

## 2025-05-16 NOTE — PROGRESS NOTES
[facility]  Department of Family Medicine      Patient:  Jesus Segal 25 y.o. male     Date of Service: 5/16/25      Chief complaint:   Chief Complaint   Patient presents with    Anxiety         History ofPresent Illness   The patient is a 25 y.o. male  presented to the clinic with complaints as above.    Anxiety  -f/u  -on buspar and celexa  -currently, stopped taking celexa as it was not helping, unclear if buspar is helping  -school going ok, graduating either summer or fall  -took buspar weeks ago     Past Medical History:      Diagnosis Date    Bilateral calf pain     Difficulty walking 2/28/2018    GERD (gastroesophageal reflux disease)     IBS (irritable bowel syndrome)     Numbness and tingling     BOTH CALFS    Pain in both lower legs 2/28/2018    Popliteal artery stenosis     Prematurity     Weakness of hand     GRIPP WEAKNESS & ALSO SHAKE WHEN GRIPPING       PastSurgical History:        Procedure Laterality Date    CIRCUMCISION      KNEE SURGERY      VASCULAR SURGERY         Allergies:    Patient has no known allergies.    Social History:   Social History     Socioeconomic History    Marital status: Single     Spouse name: Not on file    Number of children: Not on file    Years of education: Not on file    Highest education level: Not on file   Occupational History    Not on file   Tobacco Use    Smoking status: Never    Smokeless tobacco: Never   Substance and Sexual Activity    Alcohol use: No    Drug use: No    Sexual activity: Not on file   Other Topics Concern    Not on file   Social History Narrative    Not on file     Social Drivers of Health     Financial Resource Strain: Low Risk  (5/10/2024)    Overall Financial Resource Strain (CARDIA)     Difficulty of Paying Living Expenses: Not hard at all   Food Insecurity: No Food Insecurity (2/27/2025)    Hunger Vital Sign     Worried About Running Out of Food in the Last Year: Never true     Ran Out of Food in the Last Year: